# Patient Record
Sex: FEMALE | Race: WHITE | NOT HISPANIC OR LATINO
[De-identification: names, ages, dates, MRNs, and addresses within clinical notes are randomized per-mention and may not be internally consistent; named-entity substitution may affect disease eponyms.]

---

## 2017-06-28 ENCOUNTER — APPOINTMENT (OUTPATIENT)
Dept: BARIATRICS | Facility: CLINIC | Age: 22
End: 2017-06-28

## 2017-06-28 VITALS
BODY MASS INDEX: 32.44 KG/M2 | WEIGHT: 190 LBS | HEIGHT: 64 IN | DIASTOLIC BLOOD PRESSURE: 73 MMHG | HEART RATE: 67 BPM | SYSTOLIC BLOOD PRESSURE: 106 MMHG

## 2017-06-28 DIAGNOSIS — E28.2 POLYCYSTIC OVARIAN SYNDROME: ICD-10-CM

## 2017-06-28 DIAGNOSIS — L83 ACANTHOSIS NIGRICANS: ICD-10-CM

## 2017-06-28 DIAGNOSIS — R63.5 ABNORMAL WEIGHT GAIN: ICD-10-CM

## 2018-10-04 ENCOUNTER — RESULT REVIEW (OUTPATIENT)
Age: 23
End: 2018-10-04

## 2018-12-28 ENCOUNTER — MESSAGE (OUTPATIENT)
Age: 23
End: 2018-12-28

## 2019-07-29 ENCOUNTER — APPOINTMENT (OUTPATIENT)
Dept: PULMONOLOGY | Facility: CLINIC | Age: 24
End: 2019-07-29

## 2019-07-31 ENCOUNTER — APPOINTMENT (OUTPATIENT)
Dept: FAMILY MEDICINE | Facility: CLINIC | Age: 24
End: 2019-07-31
Payer: COMMERCIAL

## 2019-07-31 VITALS
DIASTOLIC BLOOD PRESSURE: 72 MMHG | OXYGEN SATURATION: 100 % | BODY MASS INDEX: 26.12 KG/M2 | WEIGHT: 153 LBS | HEART RATE: 60 BPM | HEIGHT: 64 IN | SYSTOLIC BLOOD PRESSURE: 104 MMHG

## 2019-07-31 DIAGNOSIS — Z00.00 ENCOUNTER FOR GENERAL ADULT MEDICAL EXAMINATION W/OUT ABNORMAL FINDINGS: ICD-10-CM

## 2019-07-31 DIAGNOSIS — Z11.3 ENCOUNTER FOR SCREENING FOR INFECTIONS WITH A PREDOMINANTLY SEXUAL MODE OF TRANSMISSION: ICD-10-CM

## 2019-07-31 PROCEDURE — 36415 COLL VENOUS BLD VENIPUNCTURE: CPT

## 2019-07-31 PROCEDURE — 99385 PREV VISIT NEW AGE 18-39: CPT | Mod: 25

## 2019-07-31 RX ORDER — PHENTERMINE HYDROCHLORIDE 30 MG/1
30 CAPSULE ORAL
Qty: 30 | Refills: 3 | Status: COMPLETED | COMMUNITY
Start: 2017-06-28 | End: 2019-07-31

## 2019-07-31 RX ORDER — SPIRONOLACTONE AND HYDROCHLOROTHIAZIDE 25; 25 MG/1; MG/1
25-25 TABLET, FILM COATED ORAL
Refills: 0 | Status: COMPLETED | COMMUNITY
End: 2019-07-31

## 2019-07-31 NOTE — HEALTH RISK ASSESSMENT
[] : No [Yes] : Yes [No] : In the past 12 months have you used drugs other than those required for medical reasons? No [No falls in past year] : Patient reported no falls in the past year [de-identified] : occasional [[id number]] : 1 [Student] : student [Sexually Active] : sexually active [High Risk Behavior] : no high risk behavior [Fully functional (using the telephone, shopping, preparing meals, housekeeping, doing laundry, using] : Fully functional and needs no help or supervision to perform IADLs (using the telephone, shopping, preparing meals, housekeeping, doing laundry, using transportation, managing medications and managing finances) [Fully functional (bathing, dressing, toileting, transferring, walking, feeding)] : Fully functional (bathing, dressing, toileting, transferring, walking, feeding) [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

## 2019-07-31 NOTE — HISTORY OF PRESENT ILLNESS
[de-identified] : 23 yo F with no significant PMH presents for check up. Her previous PCP no longer takes her insurance. She had lap band procedure done Oct 2018. Prior to that, she had gastric sleeve. She eats mostly carbs and fish/meats. She does not eat much vegetables or fruits. She goes to the gym few times a week, works with  twice weekly. She is nursing student, stressed out. Exams coming up.\par \par pap-- not had pap in many years. She was sexually active with 1 partner but currently not active.  [FreeTextEntry1] : check up

## 2019-08-02 LAB
25(OH)D3 SERPL-MCNC: 32.3 NG/ML
ALBUMIN SERPL ELPH-MCNC: 4.8 G/DL
ALP BLD-CCNC: 58 U/L
ALT SERPL-CCNC: 27 U/L
ANION GAP SERPL CALC-SCNC: 16 MMOL/L
AST SERPL-CCNC: 22 U/L
BASOPHILS # BLD AUTO: 0.04 K/UL
BASOPHILS NFR BLD AUTO: 0.6 %
BILIRUB SERPL-MCNC: 1.2 MG/DL
BUN SERPL-MCNC: 10 MG/DL
C TRACH RRNA SPEC QL NAA+PROBE: NOT DETECTED
CALCIUM SERPL-MCNC: 10.1 MG/DL
CHLORIDE SERPL-SCNC: 106 MMOL/L
CHOLEST SERPL-MCNC: 180 MG/DL
CHOLEST/HDLC SERPL: 3 RATIO
CO2 SERPL-SCNC: 22 MMOL/L
CREAT SERPL-MCNC: 0.7 MG/DL
EOSINOPHIL # BLD AUTO: 0.07 K/UL
EOSINOPHIL NFR BLD AUTO: 1.1 %
ESTIMATED AVERAGE GLUCOSE: 100 MG/DL
FOLATE SERPL-MCNC: 13 NG/ML
GLUCOSE SERPL-MCNC: 80 MG/DL
HAV IGM SER QL: NONREACTIVE
HBA1C MFR BLD HPLC: 5.1 %
HBV CORE IGM SER QL: NONREACTIVE
HBV SURFACE AG SER QL: NONREACTIVE
HCT VFR BLD CALC: 42.9 %
HCV AB SER QL: NONREACTIVE
HCV S/CO RATIO: 0.12 S/CO
HDLC SERPL-MCNC: 60 MG/DL
HGB BLD-MCNC: 13.5 G/DL
HIV1+2 AB SPEC QL IA.RAPID: NONREACTIVE
IMM GRANULOCYTES NFR BLD AUTO: 0.2 %
LDLC SERPL CALC-MCNC: 106 MG/DL
LYMPHOCYTES # BLD AUTO: 1.41 K/UL
LYMPHOCYTES NFR BLD AUTO: 21.8 %
MAN DIFF?: NORMAL
MCHC RBC-ENTMCNC: 28.7 PG
MCHC RBC-ENTMCNC: 31.5 GM/DL
MCV RBC AUTO: 91.1 FL
MONOCYTES # BLD AUTO: 0.39 K/UL
MONOCYTES NFR BLD AUTO: 6 %
N GONORRHOEA RRNA SPEC QL NAA+PROBE: NOT DETECTED
NEUTROPHILS # BLD AUTO: 4.56 K/UL
NEUTROPHILS NFR BLD AUTO: 70.3 %
PLATELET # BLD AUTO: 300 K/UL
POTASSIUM SERPL-SCNC: 4 MMOL/L
PROT SERPL-MCNC: 7.7 G/DL
RBC # BLD: 4.71 M/UL
RBC # FLD: 12.9 %
SODIUM SERPL-SCNC: 144 MMOL/L
SOURCE AMPLIFICATION: NORMAL
T PALLIDUM AB SER QL IA: NEGATIVE
T3FREE SERPL-MCNC: 2.38 PG/ML
T4 FREE SERPL-MCNC: 1.1 NG/DL
TRIGL SERPL-MCNC: 72 MG/DL
TSH SERPL-ACNC: 0.83 UIU/ML
VIT B12 SERPL-MCNC: 715 PG/ML
WBC # FLD AUTO: 6.48 K/UL

## 2019-10-08 ENCOUNTER — APPOINTMENT (OUTPATIENT)
Dept: FAMILY MEDICINE | Facility: CLINIC | Age: 24
End: 2019-10-08
Payer: SELF-PAY

## 2019-10-08 VITALS
DIASTOLIC BLOOD PRESSURE: 62 MMHG | WEIGHT: 161 LBS | TEMPERATURE: 99.2 F | BODY MASS INDEX: 27.49 KG/M2 | SYSTOLIC BLOOD PRESSURE: 98 MMHG | OXYGEN SATURATION: 100 % | HEIGHT: 64 IN | HEART RATE: 69 BPM

## 2019-10-08 DIAGNOSIS — Z01.818 ENCOUNTER FOR OTHER PREPROCEDURAL EXAMINATION: ICD-10-CM

## 2019-10-08 PROCEDURE — 36415 COLL VENOUS BLD VENIPUNCTURE: CPT

## 2019-10-08 PROCEDURE — 99213 OFFICE O/P EST LOW 20 MIN: CPT | Mod: 25

## 2019-10-09 LAB
ALBUMIN SERPL ELPH-MCNC: 4.3 G/DL
ALP BLD-CCNC: 50 U/L
ALT SERPL-CCNC: 20 U/L
ANION GAP SERPL CALC-SCNC: 10 MMOL/L
AST SERPL-CCNC: 15 U/L
BASOPHILS # BLD AUTO: 0.04 K/UL
BASOPHILS NFR BLD AUTO: 0.5 %
BILIRUB SERPL-MCNC: 0.8 MG/DL
BUN SERPL-MCNC: 12 MG/DL
CALCIUM SERPL-MCNC: 9.5 MG/DL
CHLORIDE SERPL-SCNC: 105 MMOL/L
CO2 SERPL-SCNC: 24 MMOL/L
CREAT SERPL-MCNC: 0.62 MG/DL
EOSINOPHIL # BLD AUTO: 0.11 K/UL
EOSINOPHIL NFR BLD AUTO: 1.3 %
GLUCOSE SERPL-MCNC: 74 MG/DL
HCG SERPL-MCNC: <1 MIU/ML
HCT VFR BLD CALC: 36.9 %
HGB BLD-MCNC: 11.4 G/DL
IMM GRANULOCYTES NFR BLD AUTO: 0.4 %
LYMPHOCYTES # BLD AUTO: 2.56 K/UL
LYMPHOCYTES NFR BLD AUTO: 30.1 %
MAN DIFF?: NORMAL
MCHC RBC-ENTMCNC: 28.9 PG
MCHC RBC-ENTMCNC: 30.9 GM/DL
MCV RBC AUTO: 93.4 FL
MONOCYTES # BLD AUTO: 0.6 K/UL
MONOCYTES NFR BLD AUTO: 7.1 %
NEUTROPHILS # BLD AUTO: 5.17 K/UL
NEUTROPHILS NFR BLD AUTO: 60.6 %
PLATELET # BLD AUTO: 283 K/UL
POTASSIUM SERPL-SCNC: 4.4 MMOL/L
PROT SERPL-MCNC: 6.9 G/DL
RBC # BLD: 3.95 M/UL
RBC # FLD: 14.2 %
SODIUM SERPL-SCNC: 139 MMOL/L
WBC # FLD AUTO: 8.51 K/UL

## 2019-10-09 NOTE — HISTORY OF PRESENT ILLNESS
[No Pertinent Cardiac History] : no history of aortic stenosis, atrial fibrillation, coronary artery disease, recent myocardial infarction, or implantable device/pacemaker [No Adverse Anesthesia Reaction] : no adverse anesthesia reaction in self or family member [No Pertinent Pulmonary History] : no history of asthma, COPD, sleep apnea, or smoking [Good (7-10 METs)] : Good (7-10 METs) [(Patient denies any chest pain, claudication, dyspnea on exertion, orthopnea, palpitations or syncope)] : Patient denies any chest pain, claudication, dyspnea on exertion, orthopnea, palpitations or syncope [Diabetes] : no diabetes [Chronic Anticoagulation] : no chronic anticoagulation [Chronic Kidney Disease] : no chronic kidney disease [FreeTextEntry2] : 10/18/2019 [FreeTextEntry1] : breast reconstruction [FreeTextEntry4] : 25 yo F will be getting breast lift and removal of excess skin on 10/18. She presents for pre-op clearance. She has not had complications from surgeries/anesthesia. She has no known allergies. [FreeTextEntry3] : Dr. Bob Munoz

## 2019-10-09 NOTE — ADDENDUM
[FreeTextEntry1] : Results reviewed, drop in hgb from previous. Not contraindication for surgery. HCG and CMP WNL. Pt cleared for surgery.

## 2020-01-02 ENCOUNTER — MESSAGE (OUTPATIENT)
Age: 25
End: 2020-01-02

## 2020-02-26 ENCOUNTER — RESULT REVIEW (OUTPATIENT)
Age: 25
End: 2020-02-26

## 2020-07-30 ENCOUNTER — ASOB RESULT (OUTPATIENT)
Age: 25
End: 2020-07-30

## 2020-07-30 ENCOUNTER — APPOINTMENT (OUTPATIENT)
Dept: OBGYN | Facility: CLINIC | Age: 25
End: 2020-07-30
Payer: COMMERCIAL

## 2020-07-30 PROCEDURE — 76830 TRANSVAGINAL US NON-OB: CPT

## 2020-09-23 ENCOUNTER — RESULT REVIEW (OUTPATIENT)
Age: 25
End: 2020-09-23

## 2020-10-29 ENCOUNTER — APPOINTMENT (OUTPATIENT)
Dept: HUMAN REPRODUCTION | Facility: CLINIC | Age: 25
End: 2020-10-29

## 2020-11-03 ENCOUNTER — APPOINTMENT (OUTPATIENT)
Dept: OBGYN | Facility: CLINIC | Age: 25
End: 2020-11-03
Payer: COMMERCIAL

## 2020-11-03 VITALS
HEIGHT: 64 IN | SYSTOLIC BLOOD PRESSURE: 117 MMHG | WEIGHT: 170 LBS | HEART RATE: 86 BPM | BODY MASS INDEX: 29.02 KG/M2 | DIASTOLIC BLOOD PRESSURE: 75 MMHG

## 2020-11-03 DIAGNOSIS — D25.9 LEIOMYOMA OF UTERUS, UNSPECIFIED: ICD-10-CM

## 2020-11-03 PROCEDURE — 99214 OFFICE O/P EST MOD 30 MIN: CPT

## 2020-11-03 PROCEDURE — 99072 ADDL SUPL MATRL&STAF TM PHE: CPT

## 2020-11-06 PROBLEM — D25.9 FIBROID, UTERINE: Status: ACTIVE | Noted: 2020-11-06

## 2020-11-06 NOTE — HISTORY OF PRESENT ILLNESS
[FreeTextEntry1] : 26 yo P0 here for eval of AUB.\par Start combined OCP mid March 2020 in preparation for wedding.\par Last pill 5/18 --> normal menses.\par June 23 - regular menses. Then mestruated again 7 days later.\par Was lighter but then it got heavy - saturating one pad Q 45 min.\par \par Found to have fibroid.\par July - Treated w/ aygestin BID x 1 - stopped menses for 2-3 days then resumed (not quite as heavy as before).\par Started cOCP August.  Had daily bleeding in Sept.\par Oct and Nov used OCP continuously --> no menses.\par \par Had office hsc showing cervical fibroid(?) and Rt upper uterus myoma w/SM component.\par \par \par \par Pt interested in TTC.\par \par Pt referred by Dr. Candelario\par

## 2020-11-06 NOTE — DISCUSSION/SUMMARY
[FreeTextEntry1] : 24 yo P0 w/fibroid ut. Pt w/SM component on USG.\par Possibly 2 intracavitary masses on office hsc, as per pt.\par \par \par Plan\par Talk to Dr Candelario\par \par Book hsc w.olympus bipolar resectoscope.

## 2020-11-18 ENCOUNTER — NON-APPOINTMENT (OUTPATIENT)
Age: 25
End: 2020-11-18

## 2020-11-20 ENCOUNTER — OUTPATIENT (OUTPATIENT)
Dept: OUTPATIENT SERVICES | Facility: HOSPITAL | Age: 25
LOS: 1 days | End: 2020-11-20
Payer: COMMERCIAL

## 2020-11-20 ENCOUNTER — RESULT REVIEW (OUTPATIENT)
Age: 25
End: 2020-11-20

## 2020-11-20 VITALS
TEMPERATURE: 97 F | WEIGHT: 154.98 LBS | SYSTOLIC BLOOD PRESSURE: 108 MMHG | RESPIRATION RATE: 14 BRPM | OXYGEN SATURATION: 100 % | DIASTOLIC BLOOD PRESSURE: 72 MMHG | HEIGHT: 64 IN | HEART RATE: 81 BPM

## 2020-11-20 DIAGNOSIS — Z98.890 OTHER SPECIFIED POSTPROCEDURAL STATES: Chronic | ICD-10-CM

## 2020-11-20 DIAGNOSIS — D25.9 LEIOMYOMA OF UTERUS, UNSPECIFIED: ICD-10-CM

## 2020-11-20 LAB
ANION GAP SERPL CALC-SCNC: 10 MMOL/L — SIGNIFICANT CHANGE UP (ref 5–17)
BLD GP AB SCN SERPL QL: NEGATIVE — SIGNIFICANT CHANGE UP
BUN SERPL-MCNC: 11 MG/DL — SIGNIFICANT CHANGE UP (ref 7–23)
CALCIUM SERPL-MCNC: 9.3 MG/DL — SIGNIFICANT CHANGE UP (ref 8.4–10.5)
CHLORIDE SERPL-SCNC: 106 MMOL/L — SIGNIFICANT CHANGE UP (ref 96–108)
CO2 SERPL-SCNC: 26 MMOL/L — SIGNIFICANT CHANGE UP (ref 22–31)
CREAT SERPL-MCNC: 0.59 MG/DL — SIGNIFICANT CHANGE UP (ref 0.5–1.3)
GLUCOSE SERPL-MCNC: 71 MG/DL — SIGNIFICANT CHANGE UP (ref 70–99)
HCT VFR BLD CALC: 33.9 % — LOW (ref 34.5–45)
HGB BLD-MCNC: 10.2 G/DL — LOW (ref 11.5–15.5)
MCHC RBC-ENTMCNC: 24.1 PG — LOW (ref 27–34)
MCHC RBC-ENTMCNC: 30.1 GM/DL — LOW (ref 32–36)
MCV RBC AUTO: 80.1 FL — SIGNIFICANT CHANGE UP (ref 80–100)
NRBC # BLD: 0 /100 WBCS — SIGNIFICANT CHANGE UP (ref 0–0)
PLATELET # BLD AUTO: 320 K/UL — SIGNIFICANT CHANGE UP (ref 150–400)
POTASSIUM SERPL-MCNC: 4.8 MMOL/L — SIGNIFICANT CHANGE UP (ref 3.5–5.3)
POTASSIUM SERPL-SCNC: 4.8 MMOL/L — SIGNIFICANT CHANGE UP (ref 3.5–5.3)
RBC # BLD: 4.23 M/UL — SIGNIFICANT CHANGE UP (ref 3.8–5.2)
RBC # FLD: 16.4 % — HIGH (ref 10.3–14.5)
RH IG SCN BLD-IMP: POSITIVE — SIGNIFICANT CHANGE UP
SARS-COV-2 RNA SPEC QL NAA+PROBE: SIGNIFICANT CHANGE UP
SODIUM SERPL-SCNC: 142 MMOL/L — SIGNIFICANT CHANGE UP (ref 135–145)
WBC # BLD: 6.12 K/UL — SIGNIFICANT CHANGE UP (ref 3.8–10.5)
WBC # FLD AUTO: 6.12 K/UL — SIGNIFICANT CHANGE UP (ref 3.8–10.5)

## 2020-11-20 PROCEDURE — 85027 COMPLETE CBC AUTOMATED: CPT

## 2020-11-20 PROCEDURE — U0003: CPT

## 2020-11-20 PROCEDURE — 86901 BLOOD TYPING SEROLOGIC RH(D): CPT

## 2020-11-20 PROCEDURE — 86900 BLOOD TYPING SEROLOGIC ABO: CPT

## 2020-11-20 PROCEDURE — 80048 BASIC METABOLIC PNL TOTAL CA: CPT

## 2020-11-20 PROCEDURE — 43999 UNLISTED PROCEDURE STOMACH: CPT

## 2020-11-20 PROCEDURE — G0463: CPT

## 2020-11-20 PROCEDURE — 77002 NEEDLE LOCALIZATION BY XRAY: CPT

## 2020-11-20 PROCEDURE — 86850 RBC ANTIBODY SCREEN: CPT

## 2020-11-20 RX ORDER — LIDOCAINE HCL 20 MG/ML
0.2 VIAL (ML) INJECTION ONCE
Refills: 0 | Status: DISCONTINUED | OUTPATIENT
Start: 2020-11-23 | End: 2020-12-07

## 2020-11-20 RX ORDER — SODIUM CHLORIDE 9 MG/ML
3 INJECTION INTRAMUSCULAR; INTRAVENOUS; SUBCUTANEOUS EVERY 8 HOURS
Refills: 0 | Status: DISCONTINUED | OUTPATIENT
Start: 2020-11-23 | End: 2020-12-07

## 2020-11-20 NOTE — H&P PST ADULT - NSICDXPASTMEDICALHX_GEN_ALL_CORE_FT
PAST MEDICAL HISTORY:  Chronic nonalcoholic liver disease Fatty liver disease, nonalcoholic    Metabolic syndrome X Insulin resistance    Polycystic ovaries Polycystic ovarian syndrome    Severe obesity

## 2020-11-20 NOTE — H&P PST ADULT - HISTORY OF PRESENT ILLNESS
Mrs. Mims is a 25 year old woman with PMH obesity s/p gastric sleeve then s/p lap band who has been having abnormal vaginal bleeding since May 2020 dx with fibroid scheduled for hysteroscopic myomectomy with bipolar resectoscope.    [facility]

## 2020-11-20 NOTE — H&P PST ADULT - NEUROLOGICAL DETAILS
alert and oriented x 3/normal strength/responds to verbal commands/sensation intact/responds to pain

## 2020-11-20 NOTE — H&P PST ADULT - NSICDXFAMILYHX_GEN_ALL_CORE_FT
FAMILY HISTORY:  Family history of cardiovascular disease, Family history of heart disease  Family history of cardiovascular disease, Family history of hypertension  Family history of endocrine and metabolic disease, Family history of high cholesterol  Family history of malignant neoplasm of gastrointestinal tract, Family history of colon cancer

## 2020-11-20 NOTE — H&P PST ADULT - NSICDXPROBLEM_GEN_ALL_CORE_FT
PROBLEM DIAGNOSES  Problem: Leiomyoma of uterus, unspecified  Assessment and Plan: Scheduled for hysteroscopy myomectomy with bipolar resectoscope  Labs pending-COVID, cbc, bmp and T&S  Preop instructions given.  Lap band deflation ordered to be done today.  POCT on dos urine for pregnancy

## 2020-11-20 NOTE — H&P PST ADULT - NSICDXPASTSURGICALHX_GEN_ALL_CORE_FT
PAST SURGICAL HISTORY:  Cytomegalovirus infection not present No significant past surgical history    H/O breast reconstruction 2019 s/p weight loss    History of gastric surgery 2014 gastric sleeve; lap band 2018

## 2020-11-20 NOTE — H&P PST ADULT - GASTROINTESTINAL
65M PMHx HTN, DM, HLD, HFrEF (15-25% on echo), CAD s/p PCI 7 years ago presented 7/20 with altered mental status in the setting of one week of dyspnea and weakness admitted to the CCU for management of ACS, acute on chronic CHF, RONNIE. Hospital course c/b acute upper GI bleed 2/2 two gastric ulcers; s/p clipping of one ulcer (7/27) s/p 3U PRBCs, found to have urosepsis currently on Ceftriaxone, stepped down from CCU to [facility] for further management, now pending acute rehab placement. negative detailed exam

## 2020-11-20 NOTE — H&P PST ADULT - NSANTHOSAYNRD_GEN_A_CORE
No. DONA screening performed.  STOP BANG Legend: 0-2 = LOW Risk; 3-4 = INTERMEDIATE Risk; 5-8 = HIGH Risk

## 2020-11-22 ENCOUNTER — TRANSCRIPTION ENCOUNTER (OUTPATIENT)
Age: 25
End: 2020-11-22

## 2020-11-23 ENCOUNTER — RESULT REVIEW (OUTPATIENT)
Age: 25
End: 2020-11-23

## 2020-11-23 ENCOUNTER — OUTPATIENT (OUTPATIENT)
Dept: OUTPATIENT SERVICES | Facility: HOSPITAL | Age: 25
LOS: 1 days | End: 2020-11-23
Payer: COMMERCIAL

## 2020-11-23 ENCOUNTER — APPOINTMENT (OUTPATIENT)
Dept: OBGYN | Facility: HOSPITAL | Age: 25
End: 2020-11-23

## 2020-11-23 VITALS
RESPIRATION RATE: 16 BRPM | OXYGEN SATURATION: 99 % | SYSTOLIC BLOOD PRESSURE: 103 MMHG | HEIGHT: 64 IN | DIASTOLIC BLOOD PRESSURE: 65 MMHG | WEIGHT: 154.98 LBS | HEART RATE: 64 BPM | TEMPERATURE: 98 F

## 2020-11-23 VITALS — HEART RATE: 68 BPM

## 2020-11-23 DIAGNOSIS — Z98.890 OTHER SPECIFIED POSTPROCEDURAL STATES: Chronic | ICD-10-CM

## 2020-11-23 DIAGNOSIS — D25.9 LEIOMYOMA OF UTERUS, UNSPECIFIED: ICD-10-CM

## 2020-11-23 DIAGNOSIS — N93.9 ABNORMAL UTERINE AND VAGINAL BLEEDING, UNSPECIFIED: ICD-10-CM

## 2020-11-23 LAB — RH IG SCN BLD-IMP: POSITIVE — SIGNIFICANT CHANGE UP

## 2020-11-23 PROCEDURE — 88305 TISSUE EXAM BY PATHOLOGIST: CPT

## 2020-11-23 PROCEDURE — 58558 HYSTEROSCOPY BIOPSY: CPT

## 2020-11-23 PROCEDURE — 88305 TISSUE EXAM BY PATHOLOGIST: CPT | Mod: 26

## 2020-11-23 RX ORDER — FENTANYL CITRATE 50 UG/ML
25 INJECTION INTRAVENOUS
Refills: 0 | Status: DISCONTINUED | OUTPATIENT
Start: 2020-11-23 | End: 2020-11-23

## 2020-11-23 RX ORDER — OXYCODONE HYDROCHLORIDE 5 MG/1
5 TABLET ORAL ONCE
Refills: 0 | Status: DISCONTINUED | OUTPATIENT
Start: 2020-11-23 | End: 2020-11-23

## 2020-11-23 RX ORDER — SODIUM CHLORIDE 9 MG/ML
1000 INJECTION, SOLUTION INTRAVENOUS
Refills: 0 | Status: DISCONTINUED | OUTPATIENT
Start: 2020-11-23 | End: 2020-12-07

## 2020-11-23 RX ORDER — ONDANSETRON 8 MG/1
4 TABLET, FILM COATED ORAL ONCE
Refills: 0 | Status: COMPLETED | OUTPATIENT
Start: 2020-11-23 | End: 2020-11-23

## 2020-11-23 RX ADMIN — OXYCODONE HYDROCHLORIDE 5 MILLIGRAM(S): 5 TABLET ORAL at 08:13

## 2020-11-23 RX ADMIN — ONDANSETRON 4 MILLIGRAM(S): 8 TABLET, FILM COATED ORAL at 08:13

## 2020-11-23 RX ADMIN — OXYCODONE HYDROCHLORIDE 5 MILLIGRAM(S): 5 TABLET ORAL at 09:04

## 2020-11-23 NOTE — ASU PATIENT PROFILE, ADULT - PSH
H/O breast reconstruction  2019 s/p weight loss  History of gastric surgery  2014 gastric sleeve; lap band 2018

## 2020-11-23 NOTE — BRIEF OPERATIVE NOTE - NSICDXBRIEFPROCEDURE_GEN_ALL_CORE_FT
PROCEDURES:  Dilation and curettage, uterus 23-Nov-2020 08:08:12  Latisha Chowdhury  Operative hysteroscopy with fluid management system 23-Nov-2020 08:08:01  Latisha Chowdhury

## 2020-11-23 NOTE — ASU DISCHARGE PLAN (ADULT/PEDIATRIC) - CALL YOUR DOCTOR IF YOU HAVE ANY OF THE FOLLOWING:
Pain not relieved by Medications/Fever greater than (need to indicate Fahrenheit or Celsius)/Bleeding that does not stop/Unable to urinate/Nausea and vomiting that does not stop

## 2020-11-23 NOTE — ASU PATIENT PROFILE, ADULT - AS SC BRADEN SENSORY
12/12/2019      Suzanne Whitten  [id number]  [facility] [address]        Dear Suzanne    Thank you for choosing [facility] for your CT Calcium screening. Below is an explanation of the results as well as follow up recommendations from your screening.     Findings:    Your calcium score is 100-299 and <75th for your age/gender.  You have a moderate amount of identifiable coronary plaque. Discussion with your primary care physician is recommended to formulate a plan for optimal cardiovascular health. Medications may be recommended. A heart healthy lifestyle is recommended.      If you provided us with the name of your primary care physician or cardiologist, a copy of the enclosed results will be sent to them.  If your score is abnormal, you may need further testing and treatment.    If you have any questions, please call our Heart  at,   [phone]       Sincerely,    [facility]   (4) no impairment

## 2020-11-23 NOTE — ASU PATIENT PROFILE, ADULT - PMH
Chronic nonalcoholic liver disease  Fatty liver disease, nonalcoholic  Metabolic syndrome X  Insulin resistance  Polycystic ovaries  Polycystic ovarian syndrome  Severe obesity

## 2020-11-23 NOTE — BRIEF OPERATIVE NOTE - OPERATION/FINDINGS
Normal external genitalia, vagina and cervix. Uterine cavity was examined and bilateral ostia located. Endometrial tissue diffusely normal with exception of a pedunculated mass on the anterior portion of the lower uterine segment.

## 2020-11-23 NOTE — ASU DISCHARGE PLAN (ADULT/PEDIATRIC) - CARE PROVIDER_API CALL
Pavan Krishnamurthy (MD)  Obstetrics and Gynecology  [facility] [facility], [address]th [place] [address]  Phone: [phone]  Fax: [phone]  Follow Up Time:

## 2020-11-23 NOTE — ASU DISCHARGE PLAN (ADULT/PEDIATRIC) - ASU DC SPECIAL INSTRUCTIONSFT
Postoperative Instructions      Pain control     For pain control, take the followin. Motrin 600mg four times a day, take with food  2. Add Tylenol 975 four times a day, alternated with motrin    Motrin and Tylenol can be obtained over the counter.         Postoperative restrictions   Do not drive or make important decisions for 24 hours after anesthesia. Nothing in the vagina (tampons, sexual intercourse), No tub baths, pools or hot tubs for 2 weeks (showers are ok!). No lifting anything heavier than 15 lbs, no strenuous exercise for 2 weeks after surgery.        Vaginal bleeding   Spotting and intermittent passage of blood clots per vagina is normal in first few weeks after surgery. If you are soaking 1 pad per hour, that is not normal and you should notify Dr. Krishnamurthy's office and seek medical attention right away.      Signs of Infection  Call the office and/or come to the emergency room for any of the following: foul smelling vaginal discharge, fever over 100.4F, abdominal pain or cramping that does not get better with over the counter medications, nausea/vomiting (especially if you become unable to tolerate oral intake), inability to urinate. Any of these could be signs that you may be developing an infection requiring antibiotics.      Follow Up  Call Dr. Krishnamurthy's office to schedule a postoperative appointment in 2 weeks.

## 2020-11-30 LAB — SURGICAL PATHOLOGY STUDY: SIGNIFICANT CHANGE UP

## 2020-12-08 ENCOUNTER — APPOINTMENT (OUTPATIENT)
Dept: OBGYN | Facility: CLINIC | Age: 25
End: 2020-12-08

## 2020-12-08 VITALS
TEMPERATURE: 98.4 F | DIASTOLIC BLOOD PRESSURE: 76 MMHG | HEART RATE: 84 BPM | HEIGHT: 64 IN | WEIGHT: 157.25 LBS | SYSTOLIC BLOOD PRESSURE: 115 MMHG | BODY MASS INDEX: 26.85 KG/M2

## 2020-12-08 DIAGNOSIS — N80.0 ENDOMETRIOSIS OF UTERUS: ICD-10-CM

## 2020-12-09 PROBLEM — N80.0 ADENOMYOSIS: Status: ACTIVE | Noted: 2020-12-09

## 2020-12-09 NOTE — HISTORY OF PRESENT ILLNESS
[FreeTextEntry1] : 26 yo G0 here for PO check after hsc charleen 11/23/20.\par No major complaints - no recent bleeding.\par Procedure noteworthy for singly intracavitary projection of fibroid or polyp.

## 2020-12-09 NOTE — DISCUSSION/SUMMARY
[FreeTextEntry1] : 24 yo P0 here for PO check after hsc resection of intracavitary adenomyoma. \par Path reviewed w/pt.\par Questions answered.\par Pt interested in TTC soon.\par Precautions reviewed.\par \par Plan\par Pt to f/u w/Dr. Candelario \par Will send letter.\par   Would consider MRI to evaluat for possible adeno if symptoms return

## 2021-01-08 ENCOUNTER — APPOINTMENT (OUTPATIENT)
Dept: OBGYN | Facility: HOSPITAL | Age: 26
End: 2021-01-08

## 2021-01-21 ENCOUNTER — APPOINTMENT (OUTPATIENT)
Dept: OBGYN | Facility: CLINIC | Age: 26
End: 2021-01-21

## 2021-02-16 ENCOUNTER — APPOINTMENT (OUTPATIENT)
Dept: ANTEPARTUM | Facility: CLINIC | Age: 26
End: 2021-02-16
Payer: COMMERCIAL

## 2021-02-16 ENCOUNTER — ASOB RESULT (OUTPATIENT)
Age: 26
End: 2021-02-16

## 2021-02-16 PROCEDURE — 36416 COLLJ CAPILLARY BLOOD SPEC: CPT

## 2021-02-16 PROCEDURE — 99072 ADDL SUPL MATRL&STAF TM PHE: CPT

## 2021-02-16 PROCEDURE — 76813 OB US NUCHAL MEAS 1 GEST: CPT

## 2021-02-16 PROCEDURE — 76801 OB US < 14 WKS SINGLE FETUS: CPT

## 2021-04-06 ENCOUNTER — ASOB RESULT (OUTPATIENT)
Age: 26
End: 2021-04-06

## 2021-04-06 ENCOUNTER — APPOINTMENT (OUTPATIENT)
Dept: ANTEPARTUM | Facility: CLINIC | Age: 26
End: 2021-04-06
Payer: COMMERCIAL

## 2021-04-06 PROCEDURE — 99072 ADDL SUPL MATRL&STAF TM PHE: CPT

## 2021-04-06 PROCEDURE — 76805 OB US >/= 14 WKS SNGL FETUS: CPT

## 2021-06-18 ENCOUNTER — APPOINTMENT (OUTPATIENT)
Dept: ANTEPARTUM | Facility: CLINIC | Age: 26
End: 2021-06-18

## 2021-06-23 ENCOUNTER — ASOB RESULT (OUTPATIENT)
Age: 26
End: 2021-06-23

## 2021-06-23 ENCOUNTER — APPOINTMENT (OUTPATIENT)
Dept: ANTEPARTUM | Facility: CLINIC | Age: 26
End: 2021-06-23
Payer: COMMERCIAL

## 2021-06-23 PROCEDURE — 99072 ADDL SUPL MATRL&STAF TM PHE: CPT

## 2021-06-23 PROCEDURE — 76816 OB US FOLLOW-UP PER FETUS: CPT

## 2021-06-23 PROCEDURE — 76819 FETAL BIOPHYS PROFIL W/O NST: CPT

## 2021-08-16 ENCOUNTER — INPATIENT (INPATIENT)
Facility: HOSPITAL | Age: 26
LOS: 1 days | Discharge: HOME | End: 2021-08-18
Attending: SPECIALIST | Admitting: SPECIALIST

## 2021-08-16 VITALS
RESPIRATION RATE: 16 BRPM | HEART RATE: 59 BPM | TEMPERATURE: 98 F | DIASTOLIC BLOOD PRESSURE: 54 MMHG | SYSTOLIC BLOOD PRESSURE: 97 MMHG

## 2021-08-16 DIAGNOSIS — Z98.890 OTHER SPECIFIED POSTPROCEDURAL STATES: Chronic | ICD-10-CM

## 2021-08-16 DIAGNOSIS — O26.899 OTHER SPECIFIED PREGNANCY RELATED CONDITIONS, UNSPECIFIED TRIMESTER: ICD-10-CM

## 2021-08-16 DIAGNOSIS — Z3A.00 WEEKS OF GESTATION OF PREGNANCY NOT SPECIFIED: ICD-10-CM

## 2021-08-16 RX ORDER — SODIUM CHLORIDE 9 MG/ML
1000 INJECTION, SOLUTION INTRAVENOUS
Refills: 0 | Status: DISCONTINUED | OUTPATIENT
Start: 2021-08-16 | End: 2021-08-17

## 2021-08-16 RX ORDER — OXYTOCIN 10 UNIT/ML
333.33 VIAL (ML) INJECTION
Qty: 20 | Refills: 0 | Status: COMPLETED | OUTPATIENT
Start: 2021-08-16 | End: 2021-08-17

## 2021-08-16 RX ORDER — OXYTOCIN 10 UNIT/ML
2 VIAL (ML) INJECTION
Qty: 30 | Refills: 0 | Status: DISCONTINUED | OUTPATIENT
Start: 2021-08-16 | End: 2021-08-17

## 2021-08-16 RX ORDER — IBUPROFEN 200 MG
1 TABLET ORAL
Qty: 0 | Refills: 0 | DISCHARGE

## 2021-08-16 RX ORDER — ACETAMINOPHEN 500 MG
2 TABLET ORAL
Qty: 0 | Refills: 0 | DISCHARGE

## 2021-08-16 RX ORDER — CITRIC ACID/SODIUM CITRATE 300-500 MG
15 SOLUTION, ORAL ORAL EVERY 6 HOURS
Refills: 0 | Status: DISCONTINUED | OUTPATIENT
Start: 2021-08-16 | End: 2021-08-17

## 2021-08-16 NOTE — OB PROVIDER TRIAGE NOTE - NSHPPHYSICALEXAM_GEN_ALL_CORE
Vital Signs Last 24 Hrs  T(C): 36.9 (16 Aug 2021 20:58), Max: 36.9 (16 Aug 2021 20:58)  T(F): 98.4 (16 Aug 2021 20:58), Max: 98.4 (16 Aug 2021 20:58)  HR: 64 (16 Aug 2021 21:45) (59 - 64)  BP: 99/59 (16 Aug 2021 21:45) (97/54 - 113/71)  BP(mean): --  RR: 16 (16 Aug 2021 20:58) (16 - 16)  SpO2: --    Abdomen: soft, non tender. no guarding or rebound tenderness  SSE:  +pooling, clear odorless fluid  +nitrazine  +ferning  no active lesions noted  SVE:   3-4/70/-3  TAS: vertex presentation  EFW 3175gm by Leopold's    NST reactive with moderate variability, cat 1  toco ctx 2-3 minutes

## 2021-08-16 NOTE — OB PROVIDER TRIAGE NOTE - DOMESTIC TRAVEL HIGH RISK QUESTION
Spoke to patient regarding lab results. Patient states her weight is stable at 200 pounds. Does note leg swelling, more so in the left leg. SOB also noted, and \"some days are better than others\". She goes on to state she has had chest discomfort on the left side of her chest. Onset about 2 weeks ago. Describes this as a dull pain and does radiate. Today the pain is radiating into the neck. Denies dizziness, lightheaded, nausea, diaphoresis. Patient is speaking clearly.      Please advise.    No

## 2021-08-16 NOTE — OB PROVIDER TRIAGE NOTE - HISTORY OF PRESENT ILLNESS
27 y/o pt 38 weeks  presents to triage with c/o rupture of membranes, clear odorless fluid @ 1700. pt denies any contractions or bleeding. pt denies n/v/d, fever or chills. 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 pt denies any contractions or bleeding. pt denies n/v/d, fever or chills. 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

## 2021-08-16 NOTE — OB PROVIDER TRIAGE NOTE - NSOBPROVIDERNOTE_OBGYN_ALL_OB_FT
25 y/o pt 38 weeks  presents with PROM   d/w Dr Chavez and Dr Collier  Plan:  -Admit l&d. Routine labs  -Expectant management of labor, possible Pitocin for Augmentation  -Fetus: cat 1 tracing, vertex presentation, continuous monitoring  -GBS negative  -Pain: Patient denies pain management at this time  -Covid 19 pending for patient and support person  -Consents signed and witnessed at bedside

## 2021-08-16 NOTE — OB PROVIDER H&P - PROBLEM SELECTOR PLAN 1
-Admit l&d. Routine labs  -Expectant management of labor, possible Pitocin for Augmentation  -Fetus: cat 1 tracing, vertex presentation, continuous monitoring  -GBS negative  -Pain: Patient denies pain management at this time  -Covid 19 pending for patient and support person  -Consents signed and witnessed at bedside

## 2021-08-16 NOTE — OB PROVIDER H&P - HISTORY OF PRESENT ILLNESS
25 y/o pt 38 weeks  presents to triage with c/o rupture of membranes, clear odorless fluid @ 1700. pt denies any contractions or bleeding. pt denies n/v/d, fever or chills. 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

## 2021-08-16 NOTE — OB PROVIDER H&P - NS_VAGBLEEDING_OBGYN_ALL_OB
Status: Pt is on a 1:1 at all times  She was RIS talking to her mom & daughter out loud  Pt is still disrobing & sexually preoccupied  She had a bowel movement overnight & placed it in her sink    She slept about 2 5 hours & running down the pérez to get away from her 1:1     Medication: continue to increase Seroquel & Depakote  D/C: TBD     09/19/19 0762   Team Meeting   Meeting Type Daily Rounds   Team Members Present   Team Members Present Physician;Nurse;;Occupational Therapist   Physician Team Member Fiona Whaley   Nursing Team Member [id number]  [facility] Management Team Member Annabella Degroot / Hugo Nagy   OT Team Member Luci   Patient/Family Present   Patient Present No   Patient's Family Present No None

## 2021-08-16 NOTE — OB PROVIDER LABOR PROGRESS NOTE - ASSESSMENT
Plan: 26y y/o  @ 38w in stable condition  - Con't IOL  - Continuous EFM, [place]  - Con't IVF    ***d/w attending physician Dr. Zee Fernandez MD  PGY-1 Plan: 26y y/o  @ 38w in stable condition  - Con't IOL with pitocin, to be covered by Dr Byrd  - Continuous EFM, [place]  - Con't IVF    Zee Fernandez MD  PGY-1

## 2021-08-17 ENCOUNTER — TRANSCRIPTION ENCOUNTER (OUTPATIENT)
Age: 26
End: 2021-08-17

## 2021-08-17 LAB
BASOPHILS # BLD AUTO: 0.03 K/UL — SIGNIFICANT CHANGE UP (ref 0–0.2)
BASOPHILS NFR BLD AUTO: 0.3 % — SIGNIFICANT CHANGE UP (ref 0–2)
BLD GP AB SCN SERPL QL: NEGATIVE — SIGNIFICANT CHANGE UP
COVID-19 SPIKE DOMAIN AB INTERP: POSITIVE
COVID-19 SPIKE DOMAIN ANTIBODY RESULT: 167 U/ML — HIGH
EOSINOPHIL # BLD AUTO: 0.03 K/UL — SIGNIFICANT CHANGE UP (ref 0–0.5)
EOSINOPHIL NFR BLD AUTO: 0.3 % — SIGNIFICANT CHANGE UP (ref 0–6)
HCT VFR BLD CALC: 32.1 % — LOW (ref 34.5–45)
HGB BLD-MCNC: 10.1 G/DL — LOW (ref 11.5–15.5)
IANC: 7.82 K/UL — SIGNIFICANT CHANGE UP (ref 1.5–8.5)
IMM GRANULOCYTES NFR BLD AUTO: 0.3 % — SIGNIFICANT CHANGE UP (ref 0–1.5)
LYMPHOCYTES # BLD AUTO: 2.28 K/UL — SIGNIFICANT CHANGE UP (ref 1–3.3)
LYMPHOCYTES # BLD AUTO: 21.2 % — SIGNIFICANT CHANGE UP (ref 13–44)
MCHC RBC-ENTMCNC: 26.7 PG — LOW (ref 27–34)
MCHC RBC-ENTMCNC: 31.5 GM/DL — LOW (ref 32–36)
MCV RBC AUTO: 84.9 FL — SIGNIFICANT CHANGE UP (ref 80–100)
MONOCYTES # BLD AUTO: 0.54 K/UL — SIGNIFICANT CHANGE UP (ref 0–0.9)
MONOCYTES NFR BLD AUTO: 5 % — SIGNIFICANT CHANGE UP (ref 2–14)
NEUTROPHILS # BLD AUTO: 7.82 K/UL — HIGH (ref 1.8–7.4)
NEUTROPHILS NFR BLD AUTO: 72.9 % — SIGNIFICANT CHANGE UP (ref 43–77)
NRBC # BLD: 0 /100 WBCS — SIGNIFICANT CHANGE UP
NRBC # FLD: 0 K/UL — SIGNIFICANT CHANGE UP
PLATELET # BLD AUTO: 237 K/UL — SIGNIFICANT CHANGE UP (ref 150–400)
RBC # BLD: 3.78 M/UL — LOW (ref 3.8–5.2)
RBC # FLD: 18.9 % — HIGH (ref 10.3–14.5)
RH IG SCN BLD-IMP: POSITIVE — SIGNIFICANT CHANGE UP
SARS-COV-2 IGG+IGM SERPL QL IA: 167 U/ML — HIGH
SARS-COV-2 IGG+IGM SERPL QL IA: POSITIVE
SARS-COV-2 RNA SPEC QL NAA+PROBE: SIGNIFICANT CHANGE UP
T PALLIDUM AB TITR SER: NEGATIVE — SIGNIFICANT CHANGE UP
WBC # BLD: 10.73 K/UL — HIGH (ref 3.8–10.5)
WBC # FLD AUTO: 10.73 K/UL — HIGH (ref 3.8–10.5)

## 2021-08-17 RX ORDER — OXYCODONE HYDROCHLORIDE 5 MG/1
5 TABLET ORAL ONCE
Refills: 0 | Status: DISCONTINUED | OUTPATIENT
Start: 2021-08-17 | End: 2021-08-18

## 2021-08-17 RX ORDER — BENZOCAINE 10 %
1 GEL (GRAM) MUCOUS MEMBRANE EVERY 6 HOURS
Refills: 0 | Status: DISCONTINUED | OUTPATIENT
Start: 2021-08-17 | End: 2021-08-18

## 2021-08-17 RX ORDER — DIBUCAINE 1 %
1 OINTMENT (GRAM) RECTAL EVERY 6 HOURS
Refills: 0 | Status: DISCONTINUED | OUTPATIENT
Start: 2021-08-17 | End: 2021-08-18

## 2021-08-17 RX ORDER — ACETAMINOPHEN 500 MG
975 TABLET ORAL
Refills: 0 | Status: DISCONTINUED | OUTPATIENT
Start: 2021-08-17 | End: 2021-08-18

## 2021-08-17 RX ORDER — AER TRAVELER 0.5 G/1
1 SOLUTION RECTAL; TOPICAL EVERY 4 HOURS
Refills: 0 | Status: DISCONTINUED | OUTPATIENT
Start: 2021-08-17 | End: 2021-08-18

## 2021-08-17 RX ORDER — SODIUM CHLORIDE 9 MG/ML
3 INJECTION INTRAMUSCULAR; INTRAVENOUS; SUBCUTANEOUS EVERY 8 HOURS
Refills: 0 | Status: DISCONTINUED | OUTPATIENT
Start: 2021-08-17 | End: 2021-08-18

## 2021-08-17 RX ORDER — SIMETHICONE 80 MG/1
80 TABLET, CHEWABLE ORAL EVERY 4 HOURS
Refills: 0 | Status: DISCONTINUED | OUTPATIENT
Start: 2021-08-17 | End: 2021-08-18

## 2021-08-17 RX ORDER — PRAMOXINE HYDROCHLORIDE 150 MG/15G
1 AEROSOL, FOAM RECTAL EVERY 4 HOURS
Refills: 0 | Status: DISCONTINUED | OUTPATIENT
Start: 2021-08-17 | End: 2021-08-18

## 2021-08-17 RX ORDER — IBUPROFEN 200 MG
600 TABLET ORAL EVERY 6 HOURS
Refills: 0 | Status: DISCONTINUED | OUTPATIENT
Start: 2021-08-17 | End: 2021-08-18

## 2021-08-17 RX ORDER — KETOROLAC TROMETHAMINE 30 MG/ML
30 SYRINGE (ML) INJECTION ONCE
Refills: 0 | Status: DISCONTINUED | OUTPATIENT
Start: 2021-08-17 | End: 2021-08-17

## 2021-08-17 RX ORDER — LANOLIN
1 OINTMENT (GRAM) TOPICAL EVERY 6 HOURS
Refills: 0 | Status: DISCONTINUED | OUTPATIENT
Start: 2021-08-17 | End: 2021-08-18

## 2021-08-17 RX ORDER — OXYCODONE HYDROCHLORIDE 5 MG/1
5 TABLET ORAL
Refills: 0 | Status: DISCONTINUED | OUTPATIENT
Start: 2021-08-17 | End: 2021-08-18

## 2021-08-17 RX ORDER — IBUPROFEN 200 MG
600 TABLET ORAL EVERY 6 HOURS
Refills: 0 | Status: COMPLETED | OUTPATIENT
Start: 2021-08-17 | End: 2022-07-16

## 2021-08-17 RX ORDER — MAGNESIUM HYDROXIDE 400 MG/1
30 TABLET, CHEWABLE ORAL
Refills: 0 | Status: DISCONTINUED | OUTPATIENT
Start: 2021-08-17 | End: 2021-08-18

## 2021-08-17 RX ORDER — TETANUS TOXOID, REDUCED DIPHTHERIA TOXOID AND ACELLULAR PERTUSSIS VACCINE, ADSORBED 5; 2.5; 8; 8; 2.5 [IU]/.5ML; [IU]/.5ML; UG/.5ML; UG/.5ML; UG/.5ML
0.5 SUSPENSION INTRAMUSCULAR ONCE
Refills: 0 | Status: DISCONTINUED | OUTPATIENT
Start: 2021-08-17 | End: 2021-08-18

## 2021-08-17 RX ORDER — DIPHENHYDRAMINE HCL 50 MG
25 CAPSULE ORAL EVERY 6 HOURS
Refills: 0 | Status: DISCONTINUED | OUTPATIENT
Start: 2021-08-17 | End: 2021-08-18

## 2021-08-17 RX ORDER — HYDROCORTISONE 1 %
1 OINTMENT (GRAM) TOPICAL EVERY 6 HOURS
Refills: 0 | Status: DISCONTINUED | OUTPATIENT
Start: 2021-08-17 | End: 2021-08-18

## 2021-08-17 RX ADMIN — SODIUM CHLORIDE 3 MILLILITER(S): 9 INJECTION INTRAMUSCULAR; INTRAVENOUS; SUBCUTANEOUS at 22:15

## 2021-08-17 RX ADMIN — Medication 975 MILLIGRAM(S): at 20:26

## 2021-08-17 RX ADMIN — Medication 975 MILLIGRAM(S): at 19:56

## 2021-08-17 RX ADMIN — Medication 1000 MILLIUNIT(S)/MIN: at 09:06

## 2021-08-17 RX ADMIN — Medication 30 MILLIGRAM(S): at 11:28

## 2021-08-17 RX ADMIN — Medication 30 MILLIGRAM(S): at 09:07

## 2021-08-17 NOTE — DISCHARGE NOTE OB - CARE PLAN
1 Principal Discharge DX:	 (spontaneous vaginal delivery)  Assessment and plan of treatment:	F/U 6 WEEKS

## 2021-08-17 NOTE — DISCHARGE NOTE OB - PATIENT PORTAL LINK FT
You can access the FollowMyHealth Patient Portal offered by [facility] by registering at the following website: [email or web address]. By joining [employer]’s FollowMyHealth portal, you will also be able to view your health information using other applications (apps) compatible with our system.

## 2021-08-17 NOTE — DISCHARGE NOTE OB - MATERIALS PROVIDED
Vaccinations/[facility]  Screening Program/  Immunization Record/Guide to Postpartum Care/[facility] Hearing Screen Program/Back To Sleep Handout/Shaken Baby Prevention Handout/Breastfeeding Guide and Packet/Birth Certificate Instructions/Discharge Medication Information for Patients and Families Pocket Guide

## 2021-08-17 NOTE — OB PROVIDER LABOR PROGRESS NOTE - ASSESSMENT
A/P:   26y G1 @ 38.1wga admitted after SROM @ 5p    #Labor   - Currently on Pitocin   - Will have pt labor down via peanut ball     #Fetal Wellbeing   - Cat 1    #Pain Control   - s/p epidural w/ plan for top-off     Alfredo Law, PGY-1    d/w Dr. Chavez

## 2021-08-17 NOTE — DISCHARGE NOTE OB - CARE PROVIDER_API CALL
Rayo Candelario)  Obstetrics and Gynecology  [address] [place] [address]  Phone: [phone]  Fax: [phone]  Follow Up Time:

## 2021-08-17 NOTE — OB PROVIDER LABOR PROGRESS NOTE - NS_SUBJECTIVE/OBJECTIVE_OBGYN_ALL_OB_FT
R1 Labor & Delivery Progress Note     Pt seen & examined at bedside for cervical exam.    SVE:  7/70/-3  EFM: 125/mod. variability/+accels/-decels  [place]: irregular    T(C): 36.9 (08-16-21 @ 20:58), Max: 36.9 (08-16-21 @ 20:58)  HR: 67 (08-16-21 @ 23:24) (59 - 72)  BP: 127/58 (08-16-21 @ 23:24) (97/54 - 127/58)  RR: 16 (08-16-21 @ 20:58) (16 - 16)  SpO2: --
PGY1 Labor & Delivery Progress Note     Pt examined @ 0610 due to increased rectal pressure     T(C): 36.8 (08-16-21 @ 23:21), Max: 36.9 (08-16-21 @ 20:58)  HR: 71 (08-17-21 @ 06:10) (51 - 85)  BP: 99/55 (08-17-21 @ 05:54) (94/55 - 127/58)  RR: 16 (08-16-21 @ 23:21) (16 - 16)  SpO2: 100% (08-17-21 @ 05:55) (98% - 100%)

## 2021-08-17 NOTE — DISCHARGE NOTE OB - MEDICATION SUMMARY - MEDICATIONS TO STOP TAKING
I will STOP taking the medications listed below when I get home from the hospital:    Tylenol 500 mg oral tablet  -- 2 tab(s) by mouth every 6 hours    Valtrex 500 mg oral tablet  -- 1 tab(s) by mouth once a day

## 2021-08-17 NOTE — OB RN DELIVERY SUMMARY - NS_SEPSISRSKCALC_OBGYN_ALL_OB_FT
EOS calculated successfully. EOS Risk Factor: 0.5/1000 live births ([place] national incidence); GA=38w1d; Temp=99.32; ROM=7.683; GBS='Negative'; Antibiotics='No antibiotics or any antibiotics < 2 hrs prior to birth'

## 2021-08-17 NOTE — DISCHARGE NOTE OB - MEDICATION SUMMARY - MEDICATIONS TO CHANGE
I will SWITCH the dose or number of times a day I take the medications listed below when I get home from the hospital:    Motrin 600 mg oral tablet  -- 1 tab(s) by mouth every 6 hours

## 2021-08-18 VITALS
DIASTOLIC BLOOD PRESSURE: 71 MMHG | SYSTOLIC BLOOD PRESSURE: 112 MMHG | OXYGEN SATURATION: 100 % | TEMPERATURE: 98 F | HEART RATE: 65 BPM | RESPIRATION RATE: 18 BRPM

## 2021-08-18 RX ORDER — VALACYCLOVIR 500 MG/1
1 TABLET, FILM COATED ORAL
Qty: 0 | Refills: 0 | DISCHARGE

## 2021-08-18 RX ADMIN — Medication 975 MILLIGRAM(S): at 04:09

## 2021-08-18 RX ADMIN — SODIUM CHLORIDE 3 MILLILITER(S): 9 INJECTION INTRAMUSCULAR; INTRAVENOUS; SUBCUTANEOUS at 06:00

## 2021-08-18 RX ADMIN — Medication 975 MILLIGRAM(S): at 09:55

## 2021-08-18 RX ADMIN — Medication 975 MILLIGRAM(S): at 04:39

## 2021-08-18 RX ADMIN — Medication 975 MILLIGRAM(S): at 11:07

## 2021-08-18 NOTE — PROGRESS NOTE ADULT - SUBJECTIVE AND OBJECTIVE BOX
Anesthesia Post-op Note    POD#1 S/P vaginal delivery    Patient is doing well.  OOBAA. Tolerating clears.  Pain is tolerable.  No residual anesthetic issues or complications noted.    Carlos Skinner CRNA

## 2022-03-01 ENCOUNTER — RESULT REVIEW (OUTPATIENT)
Age: 27
End: 2022-03-01

## 2022-04-25 ENCOUNTER — TRANSCRIPTION ENCOUNTER (OUTPATIENT)
Age: 27
End: 2022-04-25

## 2022-04-25 ENCOUNTER — ASOB RESULT (OUTPATIENT)
Age: 27
End: 2022-04-25

## 2022-04-25 ENCOUNTER — APPOINTMENT (OUTPATIENT)
Dept: ANTEPARTUM | Facility: CLINIC | Age: 27
End: 2022-04-25
Payer: MEDICAID

## 2022-04-25 PROCEDURE — 99202 OFFICE O/P NEW SF 15 MIN: CPT | Mod: 25

## 2022-04-25 PROCEDURE — 76802 OB US < 14 WKS ADDL FETUS: CPT

## 2022-04-25 PROCEDURE — 76814 OB US NUCHAL MEAS ADD-ON: CPT

## 2022-04-25 PROCEDURE — 76813 OB US NUCHAL MEAS 1 GEST: CPT

## 2022-04-25 PROCEDURE — 76801 OB US < 14 WKS SINGLE FETUS: CPT

## 2022-05-24 ENCOUNTER — ASOB RESULT (OUTPATIENT)
Age: 27
End: 2022-05-24

## 2022-05-24 ENCOUNTER — APPOINTMENT (OUTPATIENT)
Dept: ANTEPARTUM | Facility: CLINIC | Age: 27
End: 2022-05-24
Payer: MEDICAID

## 2022-05-24 PROCEDURE — 76817 TRANSVAGINAL US OBSTETRIC: CPT

## 2022-05-24 PROCEDURE — 76816 OB US FOLLOW-UP PER FETUS: CPT | Mod: 59

## 2022-06-15 ENCOUNTER — ASOB RESULT (OUTPATIENT)
Age: 27
End: 2022-06-15

## 2022-06-15 ENCOUNTER — APPOINTMENT (OUTPATIENT)
Dept: ANTEPARTUM | Facility: CLINIC | Age: 27
End: 2022-06-15
Payer: MEDICAID

## 2022-06-15 PROCEDURE — 76812 OB US DETAILED ADDL FETUS: CPT

## 2022-06-15 PROCEDURE — 76811 OB US DETAILED SNGL FETUS: CPT

## 2022-06-15 PROCEDURE — 76817 TRANSVAGINAL US OBSTETRIC: CPT

## 2022-06-30 ENCOUNTER — OUTPATIENT (OUTPATIENT)
Dept: OUTPATIENT SERVICES | Age: 27
LOS: 1 days | Discharge: HOME | End: 2022-06-30

## 2022-06-30 ENCOUNTER — APPOINTMENT (OUTPATIENT)
Dept: ANTEPARTUM | Facility: CLINIC | Age: 27
End: 2022-06-30

## 2022-06-30 DIAGNOSIS — Z98.890 OTHER SPECIFIED POSTPROCEDURAL STATES: Chronic | ICD-10-CM

## 2022-07-01 ENCOUNTER — APPOINTMENT (OUTPATIENT)
Dept: PEDIATRIC CARDIOLOGY | Facility: CLINIC | Age: 27
End: 2022-07-01

## 2022-07-01 PROCEDURE — 99202 OFFICE O/P NEW SF 15 MIN: CPT | Mod: 25

## 2022-07-01 PROCEDURE — 76827 ECHO EXAM OF FETAL HEART: CPT | Mod: 59

## 2022-07-01 PROCEDURE — 76825 ECHO EXAM OF FETAL HEART: CPT

## 2022-07-01 PROCEDURE — 76820 UMBILICAL ARTERY ECHO: CPT | Mod: 59

## 2022-07-01 PROCEDURE — 76825 ECHO EXAM OF FETAL HEART: CPT | Mod: 59

## 2022-07-01 PROCEDURE — 76821 MIDDLE CEREBRAL ARTERY ECHO: CPT | Mod: 59

## 2022-07-01 PROCEDURE — 93325 DOPPLER ECHO COLOR FLOW MAPG: CPT | Mod: 59

## 2022-07-01 PROCEDURE — 76821 MIDDLE CEREBRAL ARTERY ECHO: CPT

## 2022-07-01 PROCEDURE — 76820 UMBILICAL ARTERY ECHO: CPT

## 2022-07-08 ENCOUNTER — ASOB RESULT (OUTPATIENT)
Age: 27
End: 2022-07-08

## 2022-07-08 ENCOUNTER — APPOINTMENT (OUTPATIENT)
Dept: ANTEPARTUM | Facility: CLINIC | Age: 27
End: 2022-07-08

## 2022-07-08 PROCEDURE — 76816 OB US FOLLOW-UP PER FETUS: CPT

## 2022-07-08 PROCEDURE — 99212 OFFICE O/P EST SF 10 MIN: CPT | Mod: 25

## 2022-07-08 PROCEDURE — 76820 UMBILICAL ARTERY ECHO: CPT

## 2022-07-09 NOTE — OB RN PATIENT PROFILE - TOBACCO USE
Ms. Chikis Ambrocio is a 58 y.o. female who is here for evaluation of   Chief Complaint   Patient presents with    Pre-op Exam     Scheduled to have \"eye muscle surgery\" at [facility] . Office fax [phone]   . ASSESSMENT AND PLAN:    1. Diminished vision  She is medically stable for her planned ophthalmologic surgery at [facility]. 2. Type 2 diabetes mellitus with hyperglycemia, with long-term current use of insulin (HCC)  Checking A1c today  - HEMOGLOBIN A1C WITH EAG; Future  - HEMOGLOBIN A1C WITH EAG    3. Essential hypertension  Blood pressure is at goal  - AMB POC EKG ROUTINE W/ 12 LEADS, INTER & REP  - CBC WITH AUTOMATED DIFF; Future  - METABOLIC PANEL, COMPREHENSIVE; Future  - CBC WITH AUTOMATED DIFF  - METABOLIC PANEL, COMPREHENSIVE    4. Chronic right shoulder pain  Medically stable for right shoulder surgery with Dr. Diamond Conroy    5. Preop cardiovascular exam  - AMB POC EKG ROUTINE W/ 12 LEADS, INTER & REP      Orders Placed This Encounter    HEMOGLOBIN A1C WITH EAG     Standing Status:   Future     Number of Occurrences:   1     Standing Expiration Date:   8/1/2022    CBC WITH AUTOMATED DIFF     Standing Status:   Future     Number of Occurrences:   1     Standing Expiration Date:   9/6/9516    METABOLIC PANEL, COMPREHENSIVE     Standing Status:   Future     Number of Occurrences:   1     Standing Expiration Date:   8/1/2022    AMB POC EKG ROUTINE W/ 12 LEADS, INTER & REP     Order Specific Question:   Reason for Exam:     Answer:   preop           HPI  71-year-old woman with a history of vascular disease specifically peripheral arterial disease, hypertension and diabetes arrives today for preoperative medical assessment prior to cataract surgery. Diabetes is reasonably well controlled with an A1c of 8.4 in June 2022. She has not had hypoglycemic events. Hypertension is well controlled with medication. She currently takes amlodipine, labetalol, and hydrochlorothiazide.   Peripheral arterial disease-history of penetrating ulcer of the aorta. She arrives in clinic today with 2 preoperative requests-1 for right shoulder surgery with Dr. Everlene Goodpasture in the second for eye muscle surgery at [facility]. She feels well denies chest pain, pressure or tightness, shortness of breath notes that she can walk several blocks without any symptoms. She does have a history of arterial stents in her lower extremities and takes aspirin and Plavix for this. ROS:  Denies  fever, chills, cough, chest pain, SOB,  nausea, vomiting, or diarrhea. Denies wt loss, wt gain, hemoptysis, hematochezia or melena. Physical Examination:    Visit Vitals  /76   Pulse 92   Temp 97.3 °F (36.3 °C) (Temporal)   Resp 18   Ht 5' 5\" (1.651 m)   Wt 178 lb 8 oz (81 kg)   SpO2 98%   BMI 29.70 kg/m²      General:  Alert, cooperative, no distress. Head:  Normocephalic, without obvious abnormality, atraumatic. Eyes:  Conjunctivae/corneas clear. Pupils equal, round, reactive to light. Extraocular movements intact. Lungs:   Clear to auscultation bilaterally. Chest wall:  No tenderness or deformity. Cardiac:  RRR   Abdomen:   Soft, non-tender. Bowel sounds normal. No masses. No organomegaly. Extremities: Extremities normal, atraumatic, no cyanosis or edema. Pulses: 2+ and symmetric all extremities. Skin: Skin color, texture, turgor normal. No rashes or lesions. Lymph nodes: Cervical, supraclavicular, and axillary nodes normal.   Neurologic: CNII-XII intact. Normal strength, sensation, and reflexes throughout. On this date 07/11/2022 I have spent 30 minutes reviewing previous notes, test results and face to face with the patient discussing the diagnosis and importance of compliance with the treatment plan as well as documenting on the day of the visit.     Murrell Najjar MD FACP    (signed electronically) on 7/11/2022 at 7:26 AM Never smoker

## 2022-07-17 ENCOUNTER — OUTPATIENT (OUTPATIENT)
Dept: INPATIENT UNIT | Facility: HOSPITAL | Age: 27
LOS: 1 days | Discharge: HOME | End: 2022-07-17

## 2022-07-17 VITALS
RESPIRATION RATE: 16 BRPM | DIASTOLIC BLOOD PRESSURE: 59 MMHG | TEMPERATURE: 98 F | SYSTOLIC BLOOD PRESSURE: 102 MMHG | HEART RATE: 85 BPM

## 2022-07-17 VITALS — SYSTOLIC BLOOD PRESSURE: 105 MMHG | HEART RATE: 75 BPM | DIASTOLIC BLOOD PRESSURE: 59 MMHG

## 2022-07-17 DIAGNOSIS — O26.899 OTHER SPECIFIED PREGNANCY RELATED CONDITIONS, UNSPECIFIED TRIMESTER: ICD-10-CM

## 2022-07-17 DIAGNOSIS — Z98.890 OTHER SPECIFIED POSTPROCEDURAL STATES: Chronic | ICD-10-CM

## 2022-07-17 DIAGNOSIS — Z3A.00 WEEKS OF GESTATION OF PREGNANCY NOT SPECIFIED: ICD-10-CM

## 2022-07-17 LAB
APPEARANCE UR: CLEAR — SIGNIFICANT CHANGE UP
BACTERIA # UR AUTO: ABNORMAL
BILIRUB UR-MCNC: NEGATIVE — SIGNIFICANT CHANGE UP
COLOR SPEC: YELLOW — SIGNIFICANT CHANGE UP
COMMENT - URINE: SIGNIFICANT CHANGE UP
DIFF PNL FLD: NEGATIVE — SIGNIFICANT CHANGE UP
EPI CELLS # UR: 4 /HPF — SIGNIFICANT CHANGE UP (ref 0–5)
GLUCOSE UR QL: NEGATIVE — SIGNIFICANT CHANGE UP
HYALINE CASTS # UR AUTO: 0 /LPF — SIGNIFICANT CHANGE UP (ref 0–7)
KETONES UR-MCNC: NEGATIVE — SIGNIFICANT CHANGE UP
LEUKOCYTE ESTERASE UR-ACNC: ABNORMAL
NITRITE UR-MCNC: NEGATIVE — SIGNIFICANT CHANGE UP
PH UR: 6.5 — SIGNIFICANT CHANGE UP (ref 5–8)
PROT UR-MCNC: ABNORMAL
RBC CASTS # UR COMP ASSIST: 3 /HPF — SIGNIFICANT CHANGE UP (ref 0–4)
SP GR SPEC: 1.03 — SIGNIFICANT CHANGE UP (ref 1–1.05)
UROBILINOGEN FLD QL: SIGNIFICANT CHANGE UP
WBC UR QL: 14 /HPF — HIGH (ref 0–5)

## 2022-07-17 PROCEDURE — 76818 FETAL BIOPHYS PROFILE W/NST: CPT | Mod: 26

## 2022-07-17 PROCEDURE — 76830 TRANSVAGINAL US NON-OB: CPT | Mod: 26

## 2022-07-17 NOTE — OB PROVIDER TRIAGE NOTE - NSHPPHYSICALEXAM_GEN_ALL_CORE
ICU Vital Signs Last 24 Hrs  T(C): 36.5 (17 Jul 2022 00:46), Max: 36.5 (17 Jul 2022 00:46)  T(F): 97.7 (17 Jul 2022 00:46), Max: 97.7 (17 Jul 2022 00:46)  HR: 75 (17 Jul 2022 02:05) (75 - 85)  BP: 105/59 (17 Jul 2022 02:05) (102/59 - 105/59)  BP(mean): --  ABP: --  ABP(mean): --  RR: 16 (17 Jul 2022 00:46) (16 - 16)  SpO2: --    Abdomen soft nontender  TAS: Baby A: Breech, MVP: 4.39  Baby B: Cephalic MVP: 3.3   Speculum: Cervix appears closed  TVS: CL: 3.3-3.5 no funneling or dynamic changes   FHR A: 140bpm, moderate variability   FHR B: 145bpm, moderate variability  No contractions on TOCO

## 2022-07-17 NOTE — OB RN TRIAGE NOTE - NSICDXPASTSURGICALHX_GEN_ALL_CORE_FT
PAST SURGICAL HISTORY:  H/O breast reconstruction 2019 s/p weight loss    History of gastric surgery 2014 gastric sleeve; lap band 2018

## 2022-07-17 NOTE — OB PROVIDER TRIAGE NOTE - NSOBPROVIDERNOTE_OBGYN_ALL_OB_FT
No evidence of  labor at this time. Discussed findings with Dr. Hood. Pt. d/c'd home. Pt. to follow up with next OB appointment. Pt. instructed to return to triage with increase abdominal contractions, leakage of fluid, vaginal bleeding or decrease fetal movement. No evidence of  labor at this time. Discussed findings with Dr. Hood. Plan for urine culture and d/c'd home. Pt. to follow up with next OB appointment. Pt. instructed to return to triage with increase abdominal contractions, leakage of fluid, vaginal bleeding or decrease fetal movement.

## 2022-07-17 NOTE — OB PROVIDER TRIAGE NOTE - NSHPLABSRESULTS_GEN_ALL_CORE
Urinalysis Basic - ( 2022 00:37 )    Color: Yellow / Appearance: Clear / S.027 / pH: x  Gluc: x / Ketone: Negative  / Bili: Negative / Urobili: <2 mg/dL   Blood: x / Protein: Trace / Nitrite: Negative   Leuk Esterase: Large / RBC: 3 /HPF / WBC 14 /HPF   Sq Epi: x / Non Sq Epi: 4 /HPF / Bacteria: Moderate

## 2022-07-17 NOTE — OB PROVIDER TRIAGE NOTE - HISTORY OF PRESENT ILLNESS
Pt. is a 28y/o  EGA 24.1wks with mono/di TIUPs reports of constant lower back pain and upper abdominal pain. Pt. denies leakage of fluid and vaginal bleeding. Pt. reports good fetal movement.     AP: Mono/Di TIUPS - Followed for fetal growth discordance of 21% of baby B    Medical Hx: Denies    Surgical Hx:   Gastric Sleeve 2014  Lap band 2018  Breast augmentation 2019  Hysteroscopic myomectomy    OBGYN Hx:    2021 5#9   HSV (Last outbreak 2022)  Hysteroscopic myomectomy 2020    Meds: ASA and PNV    Allergies: Doxycycline - Hives

## 2022-07-18 LAB
CULTURE RESULTS: SIGNIFICANT CHANGE UP
SPECIMEN SOURCE: SIGNIFICANT CHANGE UP

## 2022-07-21 ENCOUNTER — APPOINTMENT (OUTPATIENT)
Dept: ANTEPARTUM | Facility: CLINIC | Age: 27
End: 2022-07-21

## 2022-07-22 ENCOUNTER — APPOINTMENT (OUTPATIENT)
Dept: ANTEPARTUM | Facility: CLINIC | Age: 27
End: 2022-07-22

## 2022-07-22 ENCOUNTER — ASOB RESULT (OUTPATIENT)
Age: 27
End: 2022-07-22

## 2022-07-22 PROCEDURE — 76815 OB US LIMITED FETUS(S): CPT

## 2022-07-22 PROCEDURE — 76820 UMBILICAL ARTERY ECHO: CPT

## 2022-08-05 ENCOUNTER — ASOB RESULT (OUTPATIENT)
Age: 27
End: 2022-08-05

## 2022-08-05 ENCOUNTER — APPOINTMENT (OUTPATIENT)
Dept: ANTEPARTUM | Facility: CLINIC | Age: 27
End: 2022-08-05

## 2022-08-05 PROBLEM — U07.1 COVID-19: Chronic | Status: ACTIVE | Noted: 2022-07-17

## 2022-08-05 PROCEDURE — 76820 UMBILICAL ARTERY ECHO: CPT

## 2022-08-05 PROCEDURE — 76816 OB US FOLLOW-UP PER FETUS: CPT

## 2022-08-19 ENCOUNTER — APPOINTMENT (OUTPATIENT)
Dept: ANTEPARTUM | Facility: CLINIC | Age: 27
End: 2022-08-19

## 2022-08-19 ENCOUNTER — ASOB RESULT (OUTPATIENT)
Age: 27
End: 2022-08-19

## 2022-08-19 PROCEDURE — 99213 OFFICE O/P EST LOW 20 MIN: CPT | Mod: 25

## 2022-08-19 PROCEDURE — 76816 OB US FOLLOW-UP PER FETUS: CPT

## 2022-08-19 PROCEDURE — 76819 FETAL BIOPHYS PROFIL W/O NST: CPT

## 2022-08-19 PROCEDURE — 76820 UMBILICAL ARTERY ECHO: CPT

## 2022-08-22 DIAGNOSIS — O28.3 ABNORMAL ULTRASONIC FINDING ON ANTENATAL SCREENING OF MOTHER: ICD-10-CM

## 2022-08-23 ENCOUNTER — APPOINTMENT (OUTPATIENT)
Dept: OTHER | Facility: CLINIC | Age: 27
End: 2022-08-23

## 2022-08-23 ENCOUNTER — ASOB RESULT (OUTPATIENT)
Age: 27
End: 2022-08-23

## 2022-08-23 ENCOUNTER — APPOINTMENT (OUTPATIENT)
Dept: ANTEPARTUM | Facility: CLINIC | Age: 27
End: 2022-08-23

## 2022-08-23 PROCEDURE — 76820 UMBILICAL ARTERY ECHO: CPT | Mod: 59

## 2022-08-23 PROCEDURE — 99213 OFFICE O/P EST LOW 20 MIN: CPT | Mod: 25

## 2022-08-23 PROCEDURE — 76816 OB US FOLLOW-UP PER FETUS: CPT

## 2022-08-23 PROCEDURE — 76819 FETAL BIOPHYS PROFIL W/O NST: CPT

## 2022-08-23 PROCEDURE — 99203 OFFICE O/P NEW LOW 30 MIN: CPT | Mod: 95

## 2022-08-23 PROCEDURE — 76819 FETAL BIOPHYS PROFIL W/O NST: CPT | Mod: 59

## 2022-08-23 PROCEDURE — 76816 OB US FOLLOW-UP PER FETUS: CPT | Mod: 59

## 2022-08-23 PROCEDURE — 76820 UMBILICAL ARTERY ECHO: CPT

## 2022-08-26 ENCOUNTER — ASOB RESULT (OUTPATIENT)
Age: 27
End: 2022-08-26

## 2022-08-26 ENCOUNTER — APPOINTMENT (OUTPATIENT)
Dept: ANTEPARTUM | Facility: CLINIC | Age: 27
End: 2022-08-26

## 2022-08-26 PROCEDURE — 76820 UMBILICAL ARTERY ECHO: CPT | Mod: 59

## 2022-08-26 PROCEDURE — 76818 FETAL BIOPHYS PROFILE W/NST: CPT | Mod: 59

## 2022-08-26 PROCEDURE — 76818 FETAL BIOPHYS PROFILE W/NST: CPT

## 2022-08-29 ENCOUNTER — ASOB RESULT (OUTPATIENT)
Age: 27
End: 2022-08-29

## 2022-08-29 ENCOUNTER — APPOINTMENT (OUTPATIENT)
Dept: ANTEPARTUM | Facility: CLINIC | Age: 27
End: 2022-08-29

## 2022-08-29 PROCEDURE — 76818 FETAL BIOPHYS PROFILE W/NST: CPT | Mod: 59

## 2022-08-29 PROCEDURE — 76820 UMBILICAL ARTERY ECHO: CPT | Mod: 59

## 2022-08-29 PROCEDURE — 76820 UMBILICAL ARTERY ECHO: CPT

## 2022-09-01 ENCOUNTER — APPOINTMENT (OUTPATIENT)
Dept: ANTEPARTUM | Facility: CLINIC | Age: 27
End: 2022-09-01

## 2022-09-01 ENCOUNTER — ASOB RESULT (OUTPATIENT)
Age: 27
End: 2022-09-01

## 2022-09-01 PROCEDURE — 76820 UMBILICAL ARTERY ECHO: CPT

## 2022-09-01 PROCEDURE — 76818 FETAL BIOPHYS PROFILE W/NST: CPT

## 2022-09-01 PROCEDURE — 76818 FETAL BIOPHYS PROFILE W/NST: CPT | Mod: 59

## 2022-09-04 ENCOUNTER — OUTPATIENT (OUTPATIENT)
Dept: INPATIENT UNIT | Facility: HOSPITAL | Age: 27
LOS: 1 days | Discharge: HOME | End: 2022-09-04

## 2022-09-04 VITALS — DIASTOLIC BLOOD PRESSURE: 67 MMHG | SYSTOLIC BLOOD PRESSURE: 115 MMHG | HEART RATE: 63 BPM

## 2022-09-04 VITALS
TEMPERATURE: 99 F | DIASTOLIC BLOOD PRESSURE: 72 MMHG | SYSTOLIC BLOOD PRESSURE: 113 MMHG | RESPIRATION RATE: 16 BRPM | HEART RATE: 85 BPM

## 2022-09-04 DIAGNOSIS — O26.899 OTHER SPECIFIED PREGNANCY RELATED CONDITIONS, UNSPECIFIED TRIMESTER: ICD-10-CM

## 2022-09-04 DIAGNOSIS — Z98.890 OTHER SPECIFIED POSTPROCEDURAL STATES: Chronic | ICD-10-CM

## 2022-09-04 DIAGNOSIS — Z3A.00 WEEKS OF GESTATION OF PREGNANCY NOT SPECIFIED: ICD-10-CM

## 2022-09-04 PROCEDURE — 99214 OFFICE O/P EST MOD 30 MIN: CPT

## 2022-09-04 PROCEDURE — 76818 FETAL BIOPHYS PROFILE W/NST: CPT | Mod: 26

## 2022-09-04 NOTE — OB PROVIDER TRIAGE NOTE - NSICDXPASTSURGICALHX_GEN_ALL_CORE_FT
PAST SURGICAL HISTORY:  H/O breast reconstruction 2019 s/p weight loss    History of D&C     History of gastric surgery 2014 gastric sleeve; lap band 2018

## 2022-09-04 NOTE — OB PROVIDER TRIAGE NOTE - NSICDXPASTMEDICALHX_GEN_ALL_CORE_FT
PAST MEDICAL HISTORY:  2019 novel coronavirus disease (COVID-19) 3/2020 & 12/2021    Adenomyosis     Anemia

## 2022-09-04 NOTE — OB PROVIDER TRIAGE NOTE - PLAN OF CARE
Plan d/w Dr. Avalos  patient to be discharged home   PTL instructions reviewed  Fetal kick counts reviewed  patient to follow up at next scheduled appointment on Tuesday Plan d/w Dr. Avalos  patient to be discharged home   PTL instructions reviewed  strict Fetal kick counts reviewed  patient to follow up at next scheduled appointment on Tuesday

## 2022-09-04 NOTE — OB PROVIDER TRIAGE NOTE - HISTORY OF PRESENT ILLNESS
27 year old  at 31.1 with mono-di TIUP presented to triage with referral for NST. Patient states that baby B has decreased blood flow and was instructed to come for NST on either  or monday. Patient denies contractions, cramping, leaking of fluid, vaginal bleeding or decreased fetal movement.   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            fibroids   PSH: Lap band          gastric sleeve          breast lift and reduction           D&C   Meds: Baby asa, pnv, folic acid, iron infusions  Allergies: Tetracycline   Denies hx of anxiety, depression, pp depression  denies use of etoh, drugs, tobacco during pregnancy

## 2022-09-04 NOTE — OB PROVIDER TRIAGE NOTE - NS_OBGYNHISTORY_OBGYN_ALL_OB_FT
AP course complicated by mono-di tiup, IUGR of Baby B   OB: SAB x1 with D&C         2021 FT 5#9  GYN: Adenomyosis           D&C         HSV 2          fibroids    GBS unk.

## 2022-09-04 NOTE — OB RN TRIAGE NOTE - FALL HARM RISK - UNIVERSAL INTERVENTIONS
Bed in lowest position, wheels locked, appropriate side rails in place/Call bell, personal items and telephone in reach/Instruct patient to call for assistance before getting out of bed or chair/Non-slip footwear when patient is out of bed/[place] to call system/Physically safe environment - no spills, clutter or unnecessary equipment/Purposeful Proactive Rounding/Room/bathroom lighting operational, light cord in reach

## 2022-09-04 NOTE — OB PROVIDER TRIAGE NOTE - NS_SONONOTE_OBGYN_ALL_OB_FT
Baby A: bpp 8/8, baby B bpp 8/8 Baby A: bpp 8/8 good fetal movement noted, baby B bpp 8/8 good fetal movement noted

## 2022-09-04 NOTE — OB PROVIDER TRIAGE NOTE - NSOBPROVIDERNOTE_OBGYN_ALL_OB_FT
9910: Tracing reviewed by Dr. Gordon service attending.   Fetal statuses reassuring     plan D/w Dr. Avalos  patient to be discharged home   follow up on Tuesday at next scheduled appointment

## 2022-09-04 NOTE — OB RN TRIAGE NOTE - NSMATERNALFETALCONCERNS_OBGYN_ALL_OB_FT
Maternal/Fetal Alert  mono di twins   twin B IUGR  delivery planning 32-34 weeks   ALERT NICU   Valentine Combs RN 8/22/2022

## 2022-09-04 NOTE — OB PROVIDER TRIAGE NOTE - ADDITIONAL INSTRUCTIONS
Return to hospital for contractions, leaking of fluid, vaginal bleeding, decreased fetal movement.   Fetal kick counts reviewed  Follow up at next scheduled appointment on Tuesday.

## 2022-09-04 NOTE — OB PROVIDER TRIAGE NOTE - NSHPPHYSICALEXAM_GEN_ALL_CORE
Vital Signs Last 24 Hrs  T(C): 37.0 (04 Sep 2022 21:15), Max: 37 (04 Sep 2022 20:57)  T(F): 98.6 (04 Sep 2022 21:15), Max: 98.6 (04 Sep 2022 20:57)  HR: 80 (04 Sep 2022 21:15) (80 - 85)  BP: 130/57 (04 Sep 2022 21:15) (113/72 - 130/57)  BP(mean): --  RR: 16 (04 Sep 2022 20:57) (16 - 16)  SpO2: --    Parameters below as of 04 Sep 2022 20:57  Patient On (Oxygen Delivery Method): room air    Assessment reveals VSS  Abdomen soft, NT, gravid  Cat 1 tracing, no contractions noted on toco   Transabdominal Ultrasound- Baby A: BPP 8/8  Sterile Speculum-  Transvaginal Ultrasound-  Vaginal Exam-   A&Ox3  Lungs- clear bilateral  Heart- normal rate and rhytmn      PLAN: Vital Signs Last 24 Hrs  T(C): 37.0 (04 Sep 2022 21:15), Max: 37 (04 Sep 2022 20:57)  T(F): 98.6 (04 Sep 2022 21:15), Max: 98.6 (04 Sep 2022 20:57)  HR: 80 (04 Sep 2022 21:15) (80 - 85)  BP: 130/57 (04 Sep 2022 21:15) (113/72 - 130/57)  BP(mean): --  RR: 16 (04 Sep 2022 20:57) (16 - 16)  SpO2: --    Parameters below as of 04 Sep 2022 20:57  Patient On (Oxygen Delivery Method): room air    Assessment reveals VSS  Abdomen soft, NT, gravid  Cat 1 tracing Baby A:  moderate variability with accels no decels. Baby B: 125 moderate variability with accels no decels   no contractions noted on toco   Transabdominal Ultrasound-Anterior placenta, Baby A: BPP 8/8, MVP 3.42, cephalic. Baby B: BPP 8/8, MVP 3.86, transverse   A&Ox3  Lungs- clear bilateral  Heart- normal rate and rhythm

## 2022-09-06 ENCOUNTER — APPOINTMENT (OUTPATIENT)
Dept: ANTEPARTUM | Facility: CLINIC | Age: 27
End: 2022-09-06

## 2022-09-06 ENCOUNTER — ASOB RESULT (OUTPATIENT)
Age: 27
End: 2022-09-06

## 2022-09-06 PROCEDURE — 76818 FETAL BIOPHYS PROFILE W/NST: CPT

## 2022-09-06 PROCEDURE — 76816 OB US FOLLOW-UP PER FETUS: CPT

## 2022-09-06 PROCEDURE — 76820 UMBILICAL ARTERY ECHO: CPT | Mod: 59

## 2022-09-06 PROCEDURE — 76816 OB US FOLLOW-UP PER FETUS: CPT | Mod: 59

## 2022-09-09 ENCOUNTER — ASOB RESULT (OUTPATIENT)
Age: 27
End: 2022-09-09

## 2022-09-09 ENCOUNTER — APPOINTMENT (OUTPATIENT)
Dept: ANTEPARTUM | Facility: CLINIC | Age: 27
End: 2022-09-09

## 2022-09-09 PROBLEM — N80.0 ENDOMETRIOSIS OF UTERUS: Chronic | Status: ACTIVE | Noted: 2022-09-04

## 2022-09-09 PROCEDURE — 76818 FETAL BIOPHYS PROFILE W/NST: CPT | Mod: 59

## 2022-09-09 PROCEDURE — 76821 MIDDLE CEREBRAL ARTERY ECHO: CPT

## 2022-09-09 PROCEDURE — 76818 FETAL BIOPHYS PROFILE W/NST: CPT

## 2022-09-09 PROCEDURE — 76820 UMBILICAL ARTERY ECHO: CPT | Mod: 59

## 2022-09-13 ENCOUNTER — APPOINTMENT (OUTPATIENT)
Dept: ANTEPARTUM | Facility: CLINIC | Age: 27
End: 2022-09-13

## 2022-09-13 ENCOUNTER — ASOB RESULT (OUTPATIENT)
Age: 27
End: 2022-09-13

## 2022-09-13 PROCEDURE — 76821 MIDDLE CEREBRAL ARTERY ECHO: CPT

## 2022-09-13 PROCEDURE — 76820 UMBILICAL ARTERY ECHO: CPT | Mod: 59

## 2022-09-13 PROCEDURE — 76818 FETAL BIOPHYS PROFILE W/NST: CPT | Mod: 59

## 2022-09-13 PROCEDURE — 76818 FETAL BIOPHYS PROFILE W/NST: CPT

## 2022-09-16 ENCOUNTER — APPOINTMENT (OUTPATIENT)
Dept: ANTEPARTUM | Facility: CLINIC | Age: 27
End: 2022-09-16

## 2022-09-16 ENCOUNTER — ASOB RESULT (OUTPATIENT)
Age: 27
End: 2022-09-16

## 2022-09-16 PROCEDURE — 76820 UMBILICAL ARTERY ECHO: CPT | Mod: 59

## 2022-09-16 PROCEDURE — 76821 MIDDLE CEREBRAL ARTERY ECHO: CPT

## 2022-09-16 PROCEDURE — 76818 FETAL BIOPHYS PROFILE W/NST: CPT

## 2022-09-16 PROCEDURE — 76818 FETAL BIOPHYS PROFILE W/NST: CPT | Mod: 59

## 2022-09-20 ENCOUNTER — ASOB RESULT (OUTPATIENT)
Age: 27
End: 2022-09-20

## 2022-09-20 ENCOUNTER — APPOINTMENT (OUTPATIENT)
Dept: ANTEPARTUM | Facility: CLINIC | Age: 27
End: 2022-09-20

## 2022-09-20 PROCEDURE — 76820 UMBILICAL ARTERY ECHO: CPT | Mod: 59

## 2022-09-20 PROCEDURE — 76818 FETAL BIOPHYS PROFILE W/NST: CPT | Mod: 59

## 2022-09-20 PROCEDURE — 76821 MIDDLE CEREBRAL ARTERY ECHO: CPT

## 2022-09-20 PROCEDURE — 76821 MIDDLE CEREBRAL ARTERY ECHO: CPT | Mod: 59

## 2022-09-20 PROCEDURE — 76816 OB US FOLLOW-UP PER FETUS: CPT | Mod: 59

## 2022-09-20 PROCEDURE — 76816 OB US FOLLOW-UP PER FETUS: CPT

## 2022-09-21 ENCOUNTER — NON-APPOINTMENT (OUTPATIENT)
Age: 27
End: 2022-09-21

## 2022-09-22 ENCOUNTER — OUTPATIENT (OUTPATIENT)
Dept: OUTPATIENT SERVICES | Facility: HOSPITAL | Age: 27
LOS: 1 days | End: 2022-09-22

## 2022-09-22 VITALS — WEIGHT: 195.11 LBS | HEIGHT: 64.5 IN

## 2022-09-22 DIAGNOSIS — O30.033 TWIN PREGNANCY, MONOCHORIONIC/DIAMNIOTIC, THIRD TRIMESTER: ICD-10-CM

## 2022-09-22 DIAGNOSIS — B00.9 HERPESVIRAL INFECTION, UNSPECIFIED: ICD-10-CM

## 2022-09-22 DIAGNOSIS — Z98.890 OTHER SPECIFIED POSTPROCEDURAL STATES: Chronic | ICD-10-CM

## 2022-09-22 LAB
APPEARANCE UR: CLEAR — SIGNIFICANT CHANGE UP
BACTERIA # UR AUTO: ABNORMAL
BILIRUB UR-MCNC: NEGATIVE — SIGNIFICANT CHANGE UP
BLD GP AB SCN SERPL QL: NEGATIVE — SIGNIFICANT CHANGE UP
COLOR SPEC: YELLOW — SIGNIFICANT CHANGE UP
DIFF PNL FLD: NEGATIVE — SIGNIFICANT CHANGE UP
EPI CELLS # UR: 9 /HPF — HIGH (ref 0–5)
GLUCOSE UR QL: NEGATIVE — SIGNIFICANT CHANGE UP
HCT VFR BLD CALC: 32.5 % — LOW (ref 34.5–45)
HGB BLD-MCNC: 10.4 G/DL — LOW (ref 11.5–15.5)
KETONES UR-MCNC: NEGATIVE — SIGNIFICANT CHANGE UP
LEUKOCYTE ESTERASE UR-ACNC: ABNORMAL
MCHC RBC-ENTMCNC: 28.2 PG — SIGNIFICANT CHANGE UP (ref 27–34)
MCHC RBC-ENTMCNC: 32 GM/DL — SIGNIFICANT CHANGE UP (ref 32–36)
MCV RBC AUTO: 88.1 FL — SIGNIFICANT CHANGE UP (ref 80–100)
NITRITE UR-MCNC: NEGATIVE — SIGNIFICANT CHANGE UP
NRBC # BLD: 0 /100 WBCS — SIGNIFICANT CHANGE UP (ref 0–0)
NRBC # FLD: 0 K/UL — SIGNIFICANT CHANGE UP (ref 0–0)
PH UR: 6.5 — SIGNIFICANT CHANGE UP (ref 5–8)
PLATELET # BLD AUTO: 191 K/UL — SIGNIFICANT CHANGE UP (ref 150–400)
PROT UR-MCNC: ABNORMAL
RBC # BLD: 3.69 M/UL — LOW (ref 3.8–5.2)
RBC # FLD: SIGNIFICANT CHANGE UP (ref 10.3–14.5)
RBC CASTS # UR COMP ASSIST: 4 /HPF — SIGNIFICANT CHANGE UP (ref 0–4)
RH IG SCN BLD-IMP: POSITIVE — SIGNIFICANT CHANGE UP
SP GR SPEC: 1.03 — SIGNIFICANT CHANGE UP (ref 1.01–1.05)
UROBILINOGEN FLD QL: ABNORMAL
WBC # BLD: 7.29 K/UL — SIGNIFICANT CHANGE UP (ref 3.8–10.5)
WBC # FLD AUTO: 7.29 K/UL — SIGNIFICANT CHANGE UP (ref 3.8–10.5)
WBC UR QL: 7 /HPF — HIGH (ref 0–5)

## 2022-09-22 RX ORDER — FOLIC ACID 0.8 MG
0 TABLET ORAL
Qty: 0 | Refills: 0 | DISCHARGE

## 2022-09-22 NOTE — OB PST NOTE - NS_CONSENTSAPP_OBGYN_ALL_OB
75 
No

## 2022-09-22 NOTE — OB PST NOTE - PROBLEM SELECTOR PLAN 1
Patient is tentatively scheduled for Primary  section with Dr Candelario on 2022.    Pre-op instructions provided. Pt given verbal and written instructions with teach back on chlorhexidine wash. Pt verbalized understanding with return demonstration.    Routine Covid PCR test ordered .Instructions regarding covid PCR test and locations for covid testing site provided. Pt verbalized understanding    Instructed pt to watch pain/anesthesia videos prior to surgery.

## 2022-09-22 NOTE — OB PST NOTE - NS_OBGYNHISTORY_OBGYN_ALL_OB_FT
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

## 2022-09-22 NOTE — OB PST NOTE - ALERT: PERTINENT HISTORY
1st Trimester Sonogram/20 Week Level II Sonogram Fetal Sonogram/1st Trimester Sonogram/20 Week Level II Sonogram/BioPhysical Profile(s)/Fetal Non-Stress Test (NST)

## 2022-09-22 NOTE — OB PST NOTE - FALL HARM RISK - UNIVERSAL INTERVENTIONS
Bed in lowest position, wheels locked, appropriate side rails in place/Call bell, personal items and telephone in reach/Instruct patient to call for assistance before getting out of bed or chair/Non-slip footwear when patient is out of bed/[place] to call system/Physically safe environment - no spills, clutter or unnecessary equipment/Purposeful Proactive Rounding/Room/bathroom lighting operational, light cord in reach

## 2022-09-22 NOTE — OB PST NOTE - NSHPPHYSICALEXAM_GEN_ALL_CORE
Constitutional: well developed, well groomed, well nourished, no distress    Eyes: PERRL, EOMI, conjunctiva clear    Ears: normal    Mouth and Gums: normal, moist    Pharynx: no tenderness, discharge, or peritonsillar abscess    Tonsils: no redness, discharge, tenderness, or swelling    Neck: supple, no JVD, normal thyroid gland, no cervical or paraspinal tenderness    Breast: normal shape    Back: normal shape, ROM intact, strength intact, no vertebral tenderness    Respiratory: airway patent, breast sounds equal, good air movement, respiration non-labored, clear to auscultation bilaterally, no chest wall tenderness, no wheezes, rhonchi, or rales    Cardiovascular: regular rate and rhythm, no rubs, murmur, normal PMI    Gastrointestinal: gravid abdomen, non tender, normal bowel sounds    Extremities: no clubbing, cyanosis, +1 pedal edema    Vascular: carotid pulse normal, radial pulse normal, DP pulse normal, PT pulse normal    Neurological: alert and oriented x3, sensation intact, cranial nerves intact, normal strength    Skin: warm and dry, normal color    Lymph nodes: no anterior cervical lymphadenopathy    Musculoskeletal: ROM intact, normal strength, no joint swelling, warmth, or calf tenderness    Psychiatric: normal affect, normal behavior

## 2022-09-22 NOTE — OB PST NOTE - NSHPREVIEWOFSYSTEMS_GEN_ALL_CORE
General: no fever, chills, sweating, anorexia, or weight loss    Skin: no rashes, itching, or dryness    Breast: no tenderness, lumps, or nipple discharge    Ophtalmologic: no diplopia, photophobia, or blurred vision    ENMT: no hearing difficulty, ear pain, tinnitus, or vertigo. No sinus symptoms, nasal congestion, nasal discharge, or nasal obstruction    Respiratory and Thorax: no wheezing, dyspnea, or cough    Cardiovascular: no chest pain, palpitations, dyspnea on exertion, orthopnea, or peripheral edema    Gastrointestinal: no nausea, vomiting, diarrhea, constipation, change in bowel movements, or abdominal pain    Genitourinary and Pelvis: no hematuria, renal colic, flank pain, dysuria, nocturia. No abnormal vaginal bleeding, vaginal discharge, spotting, pelvic pain, or vaginal leakage, pt reports of Herpes genitalis infection on valtrex since yesterday    Musculoskeletal: no arthralgia, arthritis, joint swelling, muscle cramping, muscle weakness, neck pain, arm pain, or leg pain    Neurological: no transient paralysis, weakness, paresthesias, or seizures. No syncope, tremors, vertigo, loss of sensation, difficulty walking, loss of consciousness    Psychiatric: no suicidal ideation, depression, anxiety    Hematology: no nose bleeding, easy bruising or bleeding, or skin lumps    Lymphatic: no enlarged or tender lymph nodes, or extremity swelling    Endocrine: no heat or cold intolerance    Immunologic: no recurrent or persistent infections

## 2022-09-22 NOTE — OB PST NOTE - HISTORY OF PRESENT ILLNESS
27 year old pregnant female presents to presurgical testing scheduled for primary   section. Patient denies vaginal  bleeding, spotting or leakage of amniotic fluid. Patient denies regular contractions. Patient reports positive fetal movement.      mono di twins   twin B IUGR  delivery planning 32-34 weeks  27 year old pregnant female , @ 34 weeks, presents to presurgical testing scheduled for primary   section. Patient denies vaginal  bleeding, spotting or leakage of amniotic fluid. Patient denies regular contractions. Patient reports positive fetal movement.      mono di twins   twin B IUGR  delivery planning 32-34 weeks

## 2022-09-22 NOTE — OB PST NOTE - NSICDXPASTMEDICALHX_GEN_ALL_CORE_FT
PAST MEDICAL HISTORY:  2019 novel coronavirus disease (COVID-19) 3/2020 & 12/2021    Adenomyosis     Anemia s/p iron infusion x 8 on 08/2022    HSV-2 infection

## 2022-09-23 ENCOUNTER — APPOINTMENT (OUTPATIENT)
Dept: ANTEPARTUM | Facility: CLINIC | Age: 27
End: 2022-09-23

## 2022-09-23 ENCOUNTER — ASOB RESULT (OUTPATIENT)
Age: 27
End: 2022-09-23

## 2022-09-23 PROCEDURE — 76821 MIDDLE CEREBRAL ARTERY ECHO: CPT | Mod: 59

## 2022-09-23 PROCEDURE — 76818 FETAL BIOPHYS PROFILE W/NST: CPT

## 2022-09-23 PROCEDURE — 76820 UMBILICAL ARTERY ECHO: CPT | Mod: 59

## 2022-09-23 PROCEDURE — 76821 MIDDLE CEREBRAL ARTERY ECHO: CPT

## 2022-09-25 ENCOUNTER — OUTPATIENT (OUTPATIENT)
Dept: INPATIENT UNIT | Facility: HOSPITAL | Age: 27
LOS: 1 days | Discharge: HOME | End: 2022-09-25

## 2022-09-25 VITALS — SYSTOLIC BLOOD PRESSURE: 93 MMHG | DIASTOLIC BLOOD PRESSURE: 56 MMHG | HEART RATE: 73 BPM

## 2022-09-25 VITALS
DIASTOLIC BLOOD PRESSURE: 68 MMHG | RESPIRATION RATE: 14 BRPM | HEART RATE: 89 BPM | TEMPERATURE: 99 F | SYSTOLIC BLOOD PRESSURE: 102 MMHG

## 2022-09-25 DIAGNOSIS — Z3A.00 WEEKS OF GESTATION OF PREGNANCY NOT SPECIFIED: ICD-10-CM

## 2022-09-25 DIAGNOSIS — Z98.890 OTHER SPECIFIED POSTPROCEDURAL STATES: Chronic | ICD-10-CM

## 2022-09-25 DIAGNOSIS — O26.899 OTHER SPECIFIED PREGNANCY RELATED CONDITIONS, UNSPECIFIED TRIMESTER: ICD-10-CM

## 2022-09-25 PROBLEM — D64.9 ANEMIA, UNSPECIFIED: Chronic | Status: ACTIVE | Noted: 2022-09-04

## 2022-09-25 PROCEDURE — 99212 OFFICE O/P EST SF 10 MIN: CPT

## 2022-09-25 NOTE — OB PROVIDER TRIAGE NOTE - NSICDXPASTSURGICALHX_GEN_ALL_CORE_FT
PAST SURGICAL HISTORY:  H/O breast reconstruction 2019 s/p weight loss    H/O dilation and curettage     History of D&C not related to pregnancy    History of gastric surgery 2014 gastric sleeve; lap band 2018

## 2022-09-25 NOTE — OB PROVIDER TRIAGE NOTE - PLAN OF CARE
case d/w Dr Ballard  tracing  reviewed with Dr Ballard.   will be for CS on 9/29     advised to get COVID  testing preop    s/s of labor reviewed.  FM counts reviewed   verbalized understanding  and written discharge instructions given

## 2022-09-25 NOTE — OB PROVIDER TRIAGE NOTE - HISTORY OF PRESENT ILLNESS
27 year old female  at 34.1 weeks gestation  TIUP ( MONO/DI)  who presents for NST/BPP followed for growth on Twin B    stated that she is scheduled for Primary CS on  (35 weeks)    feels GFM x2    denied any contractions  LOF VB       med hx denied   surghx  lap band               breast reduction   meds  pnvqd             ASA daily            folic acid    Valtrex 500mgs  bid   OB hx  FT   5.9#   gyn hx HSV no outbreaks  on suppression    27 year old female  at 34.1 weeks gestation  TIUP ( MONO/DI)  who presents for NST/BPP followed for growth on Twin B ( IUGR and delivery recommended )    stated that she is scheduled for Primary CS on  (35 weeks)    feels GFM x2    denied any contractions  LOF VB       med hx denied   surghx  lap band               breast reduction   meds  pnvqd             ASA daily            folic acid    Valtrex 500mgs  bid   OB hx  FT   5.9#   gyn hx HSV no outbreaks  on suppression

## 2022-09-25 NOTE — OB PROVIDER TRIAGE NOTE - ADDITIONAL INSTRUCTIONS
case d/w Dr Ballard  tracing  reviewed with Dr Ballard   will be for CS on 9/29     advised to get COVID  testing preop    s/s of labor reviewed   FM counts reviewed   verbalized understanding  and written discharge instructions given

## 2022-09-25 NOTE — OB PROVIDER TRIAGE NOTE - NSHPPHYSICALEXAM_GEN_ALL_CORE
pt seen and examined    ICU Vital Signs Last 24 Hrs  T(C): 37.3 (25 Sep 2022 07:32), Max: 37.3 (25 Sep 2022 07:32)  T(F): 99.1 (25 Sep 2022 07:32), Max: 99.1 (25 Sep 2022 07:32)  HR: 73 (25 Sep 2022 08:44) (73 - 89)  BP: 93/56 (25 Sep 2022 08:44) (93/56 - 102/68)  BP(mean): --  ABP: --  ABP(mean): --  RR: 14 (25 Sep 2022 07:32) (14 - 14)  SpO2: --    pt in nad   lungs clear   heart s1 s2   abd soft  non tender   placed on EFM    FHR twin A  135                             FHR  twin B  140   scan vertex /vertex  anterior placenta   BPP 8/8 x2

## 2022-09-25 NOTE — OB PROVIDER TRIAGE NOTE - NSOBPROVIDERNOTE_OBGYN_ALL_OB_FT
27 year old female P0 at 34.1 weeks TIUP ( MONO/DI )   NST reactive x1   BPP x2     case d/w Dr Ballard  tracing  reviewed with Dr Ballard   will be for CS on 9/29     advised to get COVID  testing preop    s/s of labor reviewed   FM counts reviewed   verbalized understanding  and written discharge instructions given     Kerri MCDONALD 27 year old female P0 at 34.1 weeks TIUP ( MONO/DI )   NST reactive x1   BPP x2     case d/w Dr Ballard  tracing  reviewed with Dr Ballard   will be for scheduled Primary CS on 9/29   advised to get COVID  testing preop    s/s of labor reviewed   FM counts reviewed   verbalized understanding  and written discharge instructions given     Kerri MCDONALD

## 2022-09-25 NOTE — OB RN TRIAGE NOTE - NSICDXPASTSURGICALHX_GEN_ALL_CORE_FT
PAST SURGICAL HISTORY:  H/O breast reconstruction 2019 s/p weight loss    History of gastric surgery 2014 gastric sleeve; lap band 2018     PAST SURGICAL HISTORY:  H/O breast reconstruction 2019 s/p weight loss    H/O dilation and curettage     History of D&C not related to pregnancy    History of gastric surgery 2014 gastric sleeve; lap band 2018

## 2022-09-25 NOTE — OB RN TRIAGE NOTE - FALL HARM RISK - UNIVERSAL INTERVENTIONS
Bed in lowest position, wheels locked, appropriate side rails in place/Call bell, personal items and telephone in reach/Instruct patient to call for assistance before getting out of bed or chair/Non-slip footwear when patient is out of bed/[place] to call system/Physically safe environment - no spills, clutter or unnecessary equipment/Purposeful Proactive Rounding/Room/bathroom lighting operational, light cord in reach

## 2022-09-26 ENCOUNTER — NON-APPOINTMENT (OUTPATIENT)
Age: 27
End: 2022-09-26

## 2022-09-28 ENCOUNTER — TRANSCRIPTION ENCOUNTER (OUTPATIENT)
Age: 27
End: 2022-09-28

## 2022-09-28 RX ORDER — FAMOTIDINE 10 MG/ML
20 INJECTION INTRAVENOUS ONCE
Refills: 0 | Status: DISCONTINUED | OUTPATIENT
Start: 2022-09-28 | End: 2022-10-09

## 2022-09-29 ENCOUNTER — INPATIENT (INPATIENT)
Facility: HOSPITAL | Age: 27
LOS: 2 days | Discharge: HOME | End: 2022-10-02
Attending: SPECIALIST | Admitting: SPECIALIST

## 2022-09-29 ENCOUNTER — RESULT REVIEW (OUTPATIENT)
Age: 27
End: 2022-09-29

## 2022-09-29 ENCOUNTER — TRANSCRIPTION ENCOUNTER (OUTPATIENT)
Age: 27
End: 2022-09-29

## 2022-09-29 VITALS — TEMPERATURE: 98 F

## 2022-09-29 DIAGNOSIS — O30.033 TWIN PREGNANCY, MONOCHORIONIC/DIAMNIOTIC, THIRD TRIMESTER: ICD-10-CM

## 2022-09-29 DIAGNOSIS — Z98.890 OTHER SPECIFIED POSTPROCEDURAL STATES: Chronic | ICD-10-CM

## 2022-09-29 LAB
BASOPHILS # BLD AUTO: 0.02 K/UL — SIGNIFICANT CHANGE UP (ref 0–0.2)
BASOPHILS NFR BLD AUTO: 0.2 % — SIGNIFICANT CHANGE UP (ref 0–2)
BLD GP AB SCN SERPL QL: NEGATIVE — SIGNIFICANT CHANGE UP
COVID-19 SPIKE DOMAIN AB INTERP: POSITIVE
COVID-19 SPIKE DOMAIN ANTIBODY RESULT: >250 U/ML — HIGH
EOSINOPHIL # BLD AUTO: 0.04 K/UL — SIGNIFICANT CHANGE UP (ref 0–0.5)
EOSINOPHIL NFR BLD AUTO: 0.5 % — SIGNIFICANT CHANGE UP (ref 0–6)
HCT VFR BLD CALC: 33.3 % — LOW (ref 34.5–45)
HGB BLD-MCNC: 10.8 G/DL — LOW (ref 11.5–15.5)
IANC: 6.13 K/UL — SIGNIFICANT CHANGE UP (ref 1.8–7.4)
IMM GRANULOCYTES NFR BLD AUTO: 0.2 % — SIGNIFICANT CHANGE UP (ref 0–0.9)
LYMPHOCYTES # BLD AUTO: 1.5 K/UL — SIGNIFICANT CHANGE UP (ref 1–3.3)
LYMPHOCYTES # BLD AUTO: 18.5 % — SIGNIFICANT CHANGE UP (ref 13–44)
MCHC RBC-ENTMCNC: 28.3 PG — SIGNIFICANT CHANGE UP (ref 27–34)
MCHC RBC-ENTMCNC: 32.4 GM/DL — SIGNIFICANT CHANGE UP (ref 32–36)
MCV RBC AUTO: 87.2 FL — SIGNIFICANT CHANGE UP (ref 80–100)
MONOCYTES # BLD AUTO: 0.4 K/UL — SIGNIFICANT CHANGE UP (ref 0–0.9)
MONOCYTES NFR BLD AUTO: 4.9 % — SIGNIFICANT CHANGE UP (ref 2–14)
NEUTROPHILS # BLD AUTO: 6.13 K/UL — SIGNIFICANT CHANGE UP (ref 1.8–7.4)
NEUTROPHILS NFR BLD AUTO: 75.7 % — SIGNIFICANT CHANGE UP (ref 43–77)
NRBC # BLD: 0 /100 WBCS — SIGNIFICANT CHANGE UP (ref 0–0)
NRBC # FLD: 0 K/UL — SIGNIFICANT CHANGE UP (ref 0–0)
PLATELET # BLD AUTO: 167 K/UL — SIGNIFICANT CHANGE UP (ref 150–400)
RBC # BLD: 3.82 M/UL — SIGNIFICANT CHANGE UP (ref 3.8–5.2)
RBC # FLD: SIGNIFICANT CHANGE UP (ref 10.3–14.5)
RH IG SCN BLD-IMP: POSITIVE — SIGNIFICANT CHANGE UP
SARS-COV-2 IGG+IGM SERPL QL IA: >250 U/ML — HIGH
SARS-COV-2 IGG+IGM SERPL QL IA: POSITIVE
T PALLIDUM AB TITR SER: NEGATIVE — SIGNIFICANT CHANGE UP
WBC # BLD: 8.11 K/UL — SIGNIFICANT CHANGE UP (ref 3.8–10.5)
WBC # FLD AUTO: 8.11 K/UL — SIGNIFICANT CHANGE UP (ref 3.8–10.5)

## 2022-09-29 RX ORDER — OXYCODONE HYDROCHLORIDE 5 MG/1
5 TABLET ORAL
Refills: 0 | Status: DISCONTINUED | OUTPATIENT
Start: 2022-09-29 | End: 2022-09-30

## 2022-09-29 RX ORDER — LANOLIN
1 OINTMENT (GRAM) TOPICAL EVERY 6 HOURS
Refills: 0 | Status: DISCONTINUED | OUTPATIENT
Start: 2022-09-29 | End: 2022-10-02

## 2022-09-29 RX ORDER — ASPIRIN/CALCIUM CARB/MAGNESIUM 324 MG
1 TABLET ORAL
Qty: 0 | Refills: 0 | DISCHARGE

## 2022-09-29 RX ORDER — FAMOTIDINE 10 MG/ML
20 INJECTION INTRAVENOUS ONCE
Refills: 0 | Status: DISCONTINUED | OUTPATIENT
Start: 2022-09-29 | End: 2022-09-29

## 2022-09-29 RX ORDER — DIPHENHYDRAMINE HCL 50 MG
25 CAPSULE ORAL EVERY 6 HOURS
Refills: 0 | Status: DISCONTINUED | OUTPATIENT
Start: 2022-09-29 | End: 2022-10-02

## 2022-09-29 RX ORDER — SODIUM CHLORIDE 9 MG/ML
1000 INJECTION, SOLUTION INTRAVENOUS ONCE
Refills: 0 | Status: DISCONTINUED | OUTPATIENT
Start: 2022-09-29 | End: 2022-09-30

## 2022-09-29 RX ORDER — SODIUM CHLORIDE 9 MG/ML
1000 INJECTION, SOLUTION INTRAVENOUS
Refills: 0 | Status: DISCONTINUED | OUTPATIENT
Start: 2022-09-29 | End: 2022-09-30

## 2022-09-29 RX ORDER — SODIUM CHLORIDE 9 MG/ML
1000 INJECTION, SOLUTION INTRAVENOUS ONCE
Refills: 0 | Status: COMPLETED | OUTPATIENT
Start: 2022-09-29 | End: 2022-09-29

## 2022-09-29 RX ORDER — ONDANSETRON 8 MG/1
4 TABLET, FILM COATED ORAL EVERY 6 HOURS
Refills: 0 | Status: DISCONTINUED | OUTPATIENT
Start: 2022-09-29 | End: 2022-09-30

## 2022-09-29 RX ORDER — DEXAMETHASONE 0.5 MG/5ML
4 ELIXIR ORAL EVERY 6 HOURS
Refills: 0 | Status: DISCONTINUED | OUTPATIENT
Start: 2022-09-29 | End: 2022-09-30

## 2022-09-29 RX ORDER — NALBUPHINE HYDROCHLORIDE 10 MG/ML
2.5 INJECTION, SOLUTION INTRAMUSCULAR; INTRAVENOUS; SUBCUTANEOUS EVERY 6 HOURS
Refills: 0 | Status: DISCONTINUED | OUTPATIENT
Start: 2022-09-29 | End: 2022-09-30

## 2022-09-29 RX ORDER — HEPARIN SODIUM 5000 [USP'U]/ML
5000 INJECTION INTRAVENOUS; SUBCUTANEOUS EVERY 12 HOURS
Refills: 0 | Status: DISCONTINUED | OUTPATIENT
Start: 2022-09-29 | End: 2022-10-02

## 2022-09-29 RX ORDER — VALACYCLOVIR 500 MG/1
0 TABLET, FILM COATED ORAL
Qty: 0 | Refills: 0 | DISCHARGE

## 2022-09-29 RX ORDER — KETOROLAC TROMETHAMINE 30 MG/ML
30 SYRINGE (ML) INJECTION EVERY 6 HOURS
Refills: 0 | Status: DISCONTINUED | OUTPATIENT
Start: 2022-09-29 | End: 2022-09-30

## 2022-09-29 RX ORDER — SODIUM CHLORIDE 9 MG/ML
1000 INJECTION, SOLUTION INTRAVENOUS
Refills: 0 | Status: DISCONTINUED | OUTPATIENT
Start: 2022-09-29 | End: 2022-09-29

## 2022-09-29 RX ORDER — MAGNESIUM HYDROXIDE 400 MG/1
30 TABLET, CHEWABLE ORAL
Refills: 0 | Status: DISCONTINUED | OUTPATIENT
Start: 2022-09-29 | End: 2022-10-02

## 2022-09-29 RX ORDER — IBUPROFEN 200 MG
600 TABLET ORAL EVERY 6 HOURS
Refills: 0 | Status: COMPLETED | OUTPATIENT
Start: 2022-09-30 | End: 2023-08-29

## 2022-09-29 RX ORDER — INFLUENZA VIRUS VACCINE 15; 15; 15; 15 UG/.5ML; UG/.5ML; UG/.5ML; UG/.5ML
0.5 SUSPENSION INTRAMUSCULAR ONCE
Refills: 0 | Status: DISCONTINUED | OUTPATIENT
Start: 2022-09-29 | End: 2022-10-02

## 2022-09-29 RX ORDER — ACETAMINOPHEN 500 MG
975 TABLET ORAL
Refills: 0 | Status: DISCONTINUED | OUTPATIENT
Start: 2022-09-29 | End: 2022-10-02

## 2022-09-29 RX ORDER — FOLIC ACID 0.8 MG
1 TABLET ORAL
Qty: 0 | Refills: 0 | DISCHARGE

## 2022-09-29 RX ORDER — SIMETHICONE 80 MG/1
80 TABLET, CHEWABLE ORAL EVERY 4 HOURS
Refills: 0 | Status: DISCONTINUED | OUTPATIENT
Start: 2022-09-29 | End: 2022-10-02

## 2022-09-29 RX ORDER — SODIUM CHLORIDE 9 MG/ML
1000 INJECTION, SOLUTION INTRAVENOUS ONCE
Refills: 0 | Status: DISCONTINUED | OUTPATIENT
Start: 2022-09-29 | End: 2022-09-29

## 2022-09-29 RX ORDER — OXYCODONE HYDROCHLORIDE 5 MG/1
10 TABLET ORAL
Refills: 0 | Status: DISCONTINUED | OUTPATIENT
Start: 2022-09-29 | End: 2022-09-30

## 2022-09-29 RX ORDER — OXYTOCIN 10 UNIT/ML
333.33 VIAL (ML) INJECTION
Qty: 20 | Refills: 0 | Status: DISCONTINUED | OUTPATIENT
Start: 2022-09-29 | End: 2022-09-30

## 2022-09-29 RX ORDER — NALOXONE HYDROCHLORIDE 4 MG/.1ML
0.1 SPRAY NASAL
Refills: 0 | Status: DISCONTINUED | OUTPATIENT
Start: 2022-09-29 | End: 2022-09-30

## 2022-09-29 RX ORDER — ACETAMINOPHEN 500 MG
1000 TABLET ORAL ONCE
Refills: 0 | Status: COMPLETED | OUTPATIENT
Start: 2022-09-29 | End: 2022-09-29

## 2022-09-29 RX ORDER — CITRIC ACID/SODIUM CITRATE 300-500 MG
30 SOLUTION, ORAL ORAL ONCE
Refills: 0 | Status: DISCONTINUED | OUTPATIENT
Start: 2022-09-29 | End: 2022-09-29

## 2022-09-29 RX ORDER — CITRIC ACID/SODIUM CITRATE 300-500 MG
30 SOLUTION, ORAL ORAL ONCE
Refills: 0 | Status: DISCONTINUED | OUTPATIENT
Start: 2022-09-29 | End: 2022-09-30

## 2022-09-29 RX ADMIN — SODIUM CHLORIDE 1000 MILLILITER(S): 9 INJECTION, SOLUTION INTRAVENOUS at 09:15

## 2022-09-29 RX ADMIN — SODIUM CHLORIDE 125 MILLILITER(S): 9 INJECTION, SOLUTION INTRAVENOUS at 06:59

## 2022-09-29 RX ADMIN — SIMETHICONE 80 MILLIGRAM(S): 80 TABLET, CHEWABLE ORAL at 21:20

## 2022-09-29 RX ADMIN — SODIUM CHLORIDE 2000 MILLILITER(S): 9 INJECTION, SOLUTION INTRAVENOUS at 06:00

## 2022-09-29 RX ADMIN — HEPARIN SODIUM 5000 UNIT(S): 5000 INJECTION INTRAVENOUS; SUBCUTANEOUS at 17:31

## 2022-09-29 RX ADMIN — Medication 30 MILLIGRAM(S): at 16:16

## 2022-09-29 RX ADMIN — Medication 1000 MILLIUNIT(S)/MIN: at 10:56

## 2022-09-29 RX ADMIN — Medication 30 MILLIGRAM(S): at 17:00

## 2022-09-29 RX ADMIN — Medication 30 MILLILITER(S): at 07:11

## 2022-09-29 RX ADMIN — Medication 30 MILLIGRAM(S): at 22:00

## 2022-09-29 RX ADMIN — Medication 1000 MILLIGRAM(S): at 11:26

## 2022-09-29 RX ADMIN — Medication 30 MILLIGRAM(S): at 21:20

## 2022-09-29 RX ADMIN — Medication 400 MILLIGRAM(S): at 10:56

## 2022-09-29 RX ADMIN — Medication 975 MILLIGRAM(S): at 18:30

## 2022-09-29 RX ADMIN — Medication 975 MILLIGRAM(S): at 17:32

## 2022-09-29 RX ADMIN — MAGNESIUM HYDROXIDE 30 MILLILITER(S): 400 TABLET, CHEWABLE ORAL at 21:20

## 2022-09-29 RX ADMIN — SODIUM CHLORIDE 75 MILLILITER(S): 9 INJECTION, SOLUTION INTRAVENOUS at 10:55

## 2022-09-29 RX ADMIN — FAMOTIDINE 20 MILLIGRAM(S): 10 INJECTION INTRAVENOUS at 07:11

## 2022-09-29 NOTE — OB NEONATOLOGY/PEDIATRICIAN DELIVERY SUMMARY - NSPEDSNEONOTESB_OBGYN_ALL_OB_FT
34.5 wk male Twin B born via primary CS to a 27 y/o  blood type B+ mother. Maternal history of HSV on valtrex, last outbreak 2022., anemia on iron transfusions, last 2022, COVID positive 2022. PNL -/-/NR/I, GBS - on . AROM at delivery with clear fluids. Baby emerged vigorous, crying, was w/d/s/s with APGARS of 8/9. Mom plans to initiate breastfeeding/formula feed, consents Hep B vaccine and declines circ.  Temp in OR was 36.5. 34.5 wk mono-di Twin B male born via primary CS to a 29 y/o  blood type B+ mother. Maternal history of HSV on valtrex, last outbreak 2022., anemia on iron transfusions, last 2022, COVID positive 2022. Prenatal hx significant for IUGR. PNL -/-/NR/I, GBS - on . AROM at delivery with clear fluids. Baby emerged vigorous, crying, was w/d/s/s with APGARS of 8/9. Mom plans to initiate breastfeeding/formula feed, consents Hep B vaccine and declines circ.  Temp in OR was 36.5. 34.5 wk mono-di Twin B male born via primary CS to a 27 y/o  blood type B+ mother. Maternal history of HSV on valtrex, last outbreak 2022., anemia on iron transfusions, last 2022, COVID positive 2022. Prenatal hx significant for IUGR. PNL -/-/NR/I, GBS - on . AROM at delivery with clear fluids. Baby emerged vigorous, crying, was w/d/s/s with APGARS of 8/9. Mom plans to initiate breastfeeding/formula feed, consents Hep B vaccine and declines circ.  Temp prior to leaving OR was 36.5C. 34.5 wk mono-di Twin B male born via primary CS to a 27 y/o  blood type B+ mother. Maternal history of HSV on valtrex, last outbreak 2022., anemia on iron transfusions, last 2022, COVID positive 2022. Prenatal hx significant for IUGR in Twin B, prenatal echo cannot rule out small VSD. PNL -/-/NR/I, GBS - on . AROM at delivery with clear fluids. Baby emerged vigorous, crying, was w/d/s/s with APGARS of 8/9. Mom plans to initiate breastfeeding/formula feed, consents Hep B vaccine and declines circ.  Temp prior to leaving OR was 36.5C.

## 2022-09-29 NOTE — OB RN INTRAOPERATIVE NOTE - NS_DRESSINGLOCATION_OBGYN_ALL_OB_FT
incisional
Airway patent, Nasal mucosa clear. Mouth with normal mucosa. Throat has no vesicles, no oropharyngeal exudates and uvula is midline.

## 2022-09-29 NOTE — DISCHARGE NOTE OB - PATIENT PORTAL LINK FT
You can access the FollowMyHealth Patient Portal offered by [facility] by registering at the following website: [email or web address]. By joining [employer]’s FollowMyHealth portal, you will also be able to view your health information using other applications (apps) compatible with our system.

## 2022-09-29 NOTE — PROVIDER CONTACT NOTE (OTHER) - BACKGROUND
Pt is a  s/p c/s for mono-di twins. QBL was 273mL. Fluids In was 2300 and urine output was 273 in the OR.

## 2022-09-29 NOTE — DISCHARGE NOTE OB - CARE PLAN
Principal Discharge DX:	Delivery by elective  section  Assessment and plan of treatment:	REGULAR DIET  AMBULATION ENCOURAGED   1

## 2022-09-29 NOTE — DISCHARGE NOTE OB - MEDICATION SUMMARY - MEDICATIONS TO TAKE
I will START or STAY ON the medications listed below when I get home from the hospital:    PNV Prenatal oral tablet  -- 1 tab(s) by mouth once a day  -- Indication: For Monochorionic diamniotic twin gestation in third trimester

## 2022-09-29 NOTE — DISCHARGE NOTE OB - CARE PROVIDER_API CALL
Rayo Candelario)  Obstetrics and Gynecology  [address] [place] [address]  Phone: [phone]  Fax: [phone]  Scheduled Appointment: 10/03/2022

## 2022-09-29 NOTE — OB NEONATOLOGY/PEDIATRICIAN DELIVERY SUMMARY - NSPEDSNEONOTESA_OBGYN_ALL_OB_FT
34.5 wk male Twin born via primary CS to a 27 y/o  blood type B+ mother. Maternal history of HSV on valtrex, last outbreak 2022., anemia on iron transfusions, last 2022, COVID positive 2022. PNL -/-/NR/I, GBS - on . AROM at delivery with clear fluids. Baby emerged vigorous, crying, was w/d/s/s with APGARS of 8/9. Mom plans to initiate breastfeeding/formula feed, consents Hep B vaccine and declines circ.  Temp in OR was 36.7. 34.5 wk mono-di Twin A male born via primary CS to a 27 y/o  blood type B+ mother. Maternal history of HSV on valtrex, last outbreak 2022., anemia on iron transfusions, last 2022, COVID positive 2022. PNL -/-/NR/I, GBS - on . AROM at delivery with clear fluids. Baby emerged vigorous, crying, was w/d/s/s with APGARS of 8/9. Mom plans to initiate breastfeeding/formula feed, consents Hep B vaccine and declines circ.  Temp in OR was 36.7. 34.5 wk mono-di Twin A male born via primary CS to a 27 y/o  blood type B+ mother. Maternal history of HSV on valtrex, last outbreak 2022., anemia on iron transfusions, last 2022, COVID positive 2022. PNL -/-/NR/I, GBS - on . AROM at delivery with clear fluids. Baby emerged vigorous, crying, was w/d/s/s with APGARS of 8/9. Mom plans to initiate breastfeeding/formula feed, consents Hep B vaccine and declines circ.  Temp prior to leaving OR was 36.7C. 34.5 wk mono-di Twin A male born via primary CS to a 29 y/o  blood type B+ mother. Maternal history of HSV on valtrex, last outbreak 2022., anemia on iron transfusions, last 2022, COVID positive 2022. Twin B with IUGR. PNL -/-/NR/I, GBS - on . AROM at delivery with clear fluids. Baby emerged vigorous, crying, was w/d/s/s with APGARS of 8/9. Mom plans to initiate breastfeeding/formula feed, consents Hep B vaccine and declines circ.  Temp prior to leaving OR was 36.7C.

## 2022-09-29 NOTE — OB PROVIDER DELIVERY SUMMARY - NSPROVIDERDELIVERYNOTE_OBGYN_ALL_OB_FT
scheduled pLTCS for multiple gestation   A. Viable male infant. APGARS 8/9. 2220g   B.  Viable male infant. APGARS 8/9.   Hysterotomy closed in single layer using caprosyn  Grossly normal uterus, tubes, and ovaries  Abdomen closed in standard fashion  Pt and infant to recovery in stable condition  Placenta to pathology. Infants to NICU.   EBL: 520  IVF: 2300  UOP: 100    Dictation: [id number]  Lluvia Correa, PGY-2   Lluvia Correa, PGY-2

## 2022-09-29 NOTE — OB RN PATIENT PROFILE - AGENT'S NAME
no loss of consciousness, no gait abnormality, no headache, no sensory deficits, and no weakness.
Frankie Mann

## 2022-09-29 NOTE — DISCHARGE NOTE OB - MATERIALS PROVIDED
[facility] [place] Screening Program/Breastfeeding Log/Guide to Postpartum Care/Back To Sleep Handout/Shaken Baby Prevention Handout/Breastfeeding Guide and Packet/Birth Certificate Instructions/Discharge Medication Information for Patients and Families Pocket Guide

## 2022-09-29 NOTE — OB RN TRIAGE NOTE - NSICDXPASTMEDICALHX_GEN_ALL_CORE_FT
PAST MEDICAL HISTORY:  2019 novel coronavirus disease (COVID-19) 3/2020 & 12/2021    Adenomyosis     Anemia s/p iron infusion x 8 on 08/2022    HSV-2 infection      PAST MEDICAL HISTORY:  2019 novel coronavirus disease (COVID-19) 3/2020 & 12/2021    Adenomyosis     Anemia s/p iron infusion x 8 on 08/2022    HSV-2 infection last outbreak August 2022

## 2022-09-29 NOTE — OB RN INTRAOPERATIVE NOTE - NSSELHIDDEN_OBGYN_ALL_OB_FT
[NS_DeliveryAttending1_OBGYN_ALL_OB_FT:[id number]],[NS_DeliveryAssist2_OBGYN_ALL_OB_FT:[id number]],[NS_DeliveryRN_OBGYN_ALL_OB_FT:[id number]]

## 2022-09-29 NOTE — OB PROVIDER H&P - HISTORY OF PRESENT ILLNESS
27 year old pregnant female  Mono-DI Twins, @ 34 5/7 weeks, presents to D&T with complaints of contractions that started since  and had gotten worse this AM around 1 AM, scheduled for primary   section 22 @ 0730. Patient denies vaginal  bleeding, spotting or leakage of amniotic fluid. Patient reports positive fetal movement. Denies fever, chills, headaches, changes in vision, chest pain, palpitations, shortness of breath, cough, nausea, vomiting, diarrhea, constipation, urinary symptoms, edema.    Prenatal care with Dr. Candelario  Prenatal course is complicated twin B IUGR delivery planning 32-34 weeks   GBS status is negative.  Patient denies signs and symptoms of COVID 19; denies symptomatic illness; fully vaccinated.    No adverse reactions to anesthesia, no objections to blood transfusions if clinically indicated.  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   2021 FT 5 lbs 9 oz  SAB x 1  anemia S/P Ion infusion x 8 round last dose on 09/15 Pt seen by hematology dr. Margie Kong   Med hx:  obesity  Surg hx:  Lap band 2018. Gastric sleeve 2014   breast lift and reduction 2019 D&C for uterine fibroid  GYN hx: denies hx of abnormal papsmear/cysts/STDs  Meds:  PNV, S/P Ion transfusion  Allergies: doxycycline Tetracycline (Hives)    Social hx: Denies alcohol, tobacco, drug use  Psych hx: denies hx of anxiety/depression; lives with spouse

## 2022-09-29 NOTE — OB PROVIDER H&P - ASSESSMENT
27 year old pregnant female  Mono-DI Twins, @ 34 5/7 weeks,   0530 discuss with Dr. Maddox  pt to be admitted to PACU for C/Section as Schedule.   ORders on Hold.       NAEEM rivas NP

## 2022-09-29 NOTE — OB PROVIDER H&P - NSCHILDCOND1_OBGYN_ALL_OB
Living 5-Fu Pregnancy And Lactation Text: This medication is Pregnancy Category X and contraindicated in pregnancy and in women who may become pregnant. It is unknown if this medication is excreted in breast milk.

## 2022-09-29 NOTE — OB PROVIDER H&P - ALERT: PERTINENT HISTORY
Fetal Sonogram/1st Trimester Sonogram/20 Week Level II Sonogram/BioPhysical Profile(s)/Fetal Non-Stress Test (NST)

## 2022-09-29 NOTE — OB PROVIDER DELIVERY SUMMARY - NSSELHIDDEN_OBGYN_ALL_OB_FT
[NS_DeliveryAttending1_OBGYN_ALL_OB_FT:[id number]],[NS_DeliveryAssist1_OBGYN_ALL_OB_FT:[id number]]

## 2022-09-29 NOTE — OB RN DELIVERY SUMMARY - NSSELHIDDEN_OBGYN_ALL_OB_FT
[NS_DeliveryAttending1_OBGYN_ALL_OB_FT:[id number]],[NS_DeliveryAssist2_OBGYN_ALL_OB_FT:[id number]],[NS_DeliveryRN_OBGYN_ALL_OB_FT:[id number]]

## 2022-09-29 NOTE — OB RN TRIAGE NOTE - NS_OBGYNHISTORY_OBGYN_ALL_OB_FT
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   2021 FT 5 lbs 9 oz  SAB x 1

## 2022-09-29 NOTE — OB RN DELIVERY SUMMARY - NS_REASONNOSKINB_OBGYN_ALL_OB
[place]'s Clinical Indication Maternal Clinical Indication/[place]'s Clinical Indication

## 2022-09-29 NOTE — OB RN PATIENT PROFILE - FALL HARM RISK - FACTORS NURSING JUDGEMENT
WOCN Note:  
 
New consult placed by RN for feet and toes. Chart shows: 
Admitted for fall at home She reports history of eczema. Assessment:  
Patient is communicative, continent and mobile - turned independently for assessment. Bed: total care SPORT - discussed with Riley Barroso, who will deliver a [facility] bed so the SPORT can be returned to ICU. Patient reports tenderness with cleaning of toes. Bilateral heel, buttocks, and sacral skin intact and without erythema. 1. POA, right second toe where great toenail has grown into skin = 1 x 0.3 x 0.1 cm  100% moist pink. no exudate or erythema. Significant amount of soft accumulated dead skin between all of toes cleaned away with soap. Aquacel Ag woven between all toes since many nails are overgrown. Dry desquamation to dorsum of hands, around trunk, on buttocks and on forehead. She is scratching. Aloe Vesta applied. Recommendations:   
Right 2nd toe: please clean well between toes and weave Aquacel Ag between toes for cushion and protection from overgrown nails Minimize layers of linen/pads under patient to optimize support surface. Turn/reposition approximately every 2 hours and offload heels. Manage incontinence / promote continence; Aloe Vesta cream to buttocks and sacrum daily and as needed Discussed above plan with Dr. Lynsey Fernandez 
 
Transition of Care: Plan to follow weekly and as needed while admitted to hospital.  
 
Elia Case, CHARUN, RN, [employer] Certified Wound, Ostomy, Continence Nurse 
office [phone] 
pager [phone] or call  to page
No

## 2022-09-29 NOTE — OB RN PATIENT PROFILE - FALL HARM RISK - UNIVERSAL INTERVENTIONS
Bed in lowest position, wheels locked, appropriate side rails in place/Call bell, personal items and telephone in reach/Instruct patient to call for assistance before getting out of bed or chair/Non-slip footwear when patient is out of bed/[place] to call system/Physically safe environment - no spills, clutter or unnecessary equipment/Purposeful Proactive Rounding/Room/bathroom lighting operational, light cord in reach

## 2022-09-29 NOTE — DISCHARGE NOTE OB - MEDICATION SUMMARY - MEDICATIONS TO STOP TAKING
I will STOP taking the medications listed below when I get home from the hospital:    Aspirin Low Dose 81 mg oral tablet, chewable  -- 1 tab(s) by mouth once a day    folic acid 0.4 mg oral tablet  -- 1 tab(s) by mouth once a day

## 2022-09-29 NOTE — OB RN TRIAGE NOTE - FALL HARM RISK - UNIVERSAL INTERVENTIONS
Bed in lowest position, wheels locked, appropriate side rails in place/Call bell, personal items and telephone in reach/Instruct patient to call for assistance before getting out of bed or chair/Non-slip footwear when patient is out of bed/[place] to call system/Physically safe environment - no spills, clutter or unnecessary equipment/Purposeful Proactive Rounding/Room/bathroom lighting operational, light cord in reach

## 2022-09-29 NOTE — OB PROVIDER H&P - NSHPPHYSICALEXAM_GEN_ALL_CORE
Vital Signs Last 24 Hrs  T(C): 36.8 (29 Sep 2022 04:29), Max: 36.8 (29 Sep 2022 04:22)  T(F): 98.2 (29 Sep 2022 04:29), Max: 98.24 (29 Sep 2022 04:22)  HR: 55 (29 Sep 2022 04:29) (55 - 55)  BP: 107/59 (29 Sep 2022 04:29) (107/59 - 107/59)  RR: 17 (29 Sep 2022 04:29) (17 - 17)      Parameters below as of 29 Sep 2022 04:29  Patient On (Oxygen Delivery Method): room air        Gen: NAD  Head: NC/AT  Cardio: S1S2+, RRR  Resp: CTABL, no wheezing  Abdomen: Soft, NT/ND, BS+  Extremities: No LE edema bilaterally    NST and BPP done to assess fetal surveillance and results as follows:  Twin A FHR: 130 HR baseline, moderate variability, accelerations present, no decelerations, Category 1.  Twin B FHR: 135 HR baseline, moderate variability, accelerations present, no decelerations, Category 1.  [place]: Contractions present, irregular,  BPP:  Twin A Vertex presentation , anterior placenta, MPV 3.88 BPP 8/8  Twin B Vertex mid maternal right anterior placenta BPP 8/8  SVE: 1/50/-3

## 2022-09-30 LAB
BASOPHILS # BLD AUTO: 0.02 K/UL — SIGNIFICANT CHANGE UP (ref 0–0.2)
BASOPHILS NFR BLD AUTO: 0.2 % — SIGNIFICANT CHANGE UP (ref 0–2)
EOSINOPHIL # BLD AUTO: 0.05 K/UL — SIGNIFICANT CHANGE UP (ref 0–0.5)
EOSINOPHIL NFR BLD AUTO: 0.5 % — SIGNIFICANT CHANGE UP (ref 0–6)
HCT VFR BLD CALC: 30.5 % — LOW (ref 34.5–45)
HGB BLD-MCNC: 9.6 G/DL — LOW (ref 11.5–15.5)
IANC: 7.37 K/UL — SIGNIFICANT CHANGE UP (ref 1.8–7.4)
IMM GRANULOCYTES NFR BLD AUTO: 0.5 % — SIGNIFICANT CHANGE UP (ref 0–0.9)
LYMPHOCYTES # BLD AUTO: 1.94 K/UL — SIGNIFICANT CHANGE UP (ref 1–3.3)
LYMPHOCYTES # BLD AUTO: 19.5 % — SIGNIFICANT CHANGE UP (ref 13–44)
MCHC RBC-ENTMCNC: 28 PG — SIGNIFICANT CHANGE UP (ref 27–34)
MCHC RBC-ENTMCNC: 31.5 GM/DL — LOW (ref 32–36)
MCV RBC AUTO: 88.9 FL — SIGNIFICANT CHANGE UP (ref 80–100)
MONOCYTES # BLD AUTO: 0.53 K/UL — SIGNIFICANT CHANGE UP (ref 0–0.9)
MONOCYTES NFR BLD AUTO: 5.3 % — SIGNIFICANT CHANGE UP (ref 2–14)
NEUTROPHILS # BLD AUTO: 7.37 K/UL — SIGNIFICANT CHANGE UP (ref 1.8–7.4)
NEUTROPHILS NFR BLD AUTO: 74 % — SIGNIFICANT CHANGE UP (ref 43–77)
NRBC # BLD: 0 /100 WBCS — SIGNIFICANT CHANGE UP (ref 0–0)
NRBC # FLD: 0 K/UL — SIGNIFICANT CHANGE UP (ref 0–0)
PLATELET # BLD AUTO: 180 K/UL — SIGNIFICANT CHANGE UP (ref 150–400)
RBC # BLD: 3.43 M/UL — LOW (ref 3.8–5.2)
RBC # FLD: SIGNIFICANT CHANGE UP (ref 10.3–14.5)
WBC # BLD: 9.96 K/UL — SIGNIFICANT CHANGE UP (ref 3.8–10.5)
WBC # FLD AUTO: 9.96 K/UL — SIGNIFICANT CHANGE UP (ref 3.8–10.5)

## 2022-09-30 RX ORDER — IBUPROFEN 200 MG
600 TABLET ORAL EVERY 6 HOURS
Refills: 0 | Status: DISCONTINUED | OUTPATIENT
Start: 2022-09-30 | End: 2022-10-02

## 2022-09-30 RX ADMIN — Medication 30 MILLIGRAM(S): at 02:57

## 2022-09-30 RX ADMIN — Medication 600 MILLIGRAM(S): at 22:20

## 2022-09-30 RX ADMIN — Medication 600 MILLIGRAM(S): at 16:15

## 2022-09-30 RX ADMIN — Medication 600 MILLIGRAM(S): at 21:21

## 2022-09-30 RX ADMIN — Medication 975 MILLIGRAM(S): at 13:50

## 2022-09-30 RX ADMIN — HEPARIN SODIUM 5000 UNIT(S): 5000 INJECTION INTRAVENOUS; SUBCUTANEOUS at 18:02

## 2022-09-30 RX ADMIN — Medication 30 MILLIGRAM(S): at 03:30

## 2022-09-30 RX ADMIN — Medication 975 MILLIGRAM(S): at 05:52

## 2022-09-30 RX ADMIN — SIMETHICONE 80 MILLIGRAM(S): 80 TABLET, CHEWABLE ORAL at 04:19

## 2022-09-30 RX ADMIN — HEPARIN SODIUM 5000 UNIT(S): 5000 INJECTION INTRAVENOUS; SUBCUTANEOUS at 05:52

## 2022-09-30 RX ADMIN — Medication 600 MILLIGRAM(S): at 08:56

## 2022-09-30 RX ADMIN — Medication 975 MILLIGRAM(S): at 13:20

## 2022-09-30 RX ADMIN — SIMETHICONE 80 MILLIGRAM(S): 80 TABLET, CHEWABLE ORAL at 21:21

## 2022-09-30 RX ADMIN — Medication 600 MILLIGRAM(S): at 16:45

## 2022-09-30 RX ADMIN — Medication 600 MILLIGRAM(S): at 09:06

## 2022-09-30 RX ADMIN — Medication 975 MILLIGRAM(S): at 01:00

## 2022-09-30 RX ADMIN — Medication 975 MILLIGRAM(S): at 00:19

## 2022-09-30 NOTE — PROGRESS NOTE ADULT - SUBJECTIVE AND OBJECTIVE BOX
Postpartum Note,  Section  She is a  27y woman who is now post-operative day:     Subjective:  The patient feels well.  She is ambulating.   She is tolerating regular diet.  She denies nausea and vomiting.  She is voiding.  Her pain is controlled.  She reports normal postpartum bleeding    Physical exam:    Vital Signs Last 24 Hrs  T(C): 36.7 (30 Sep 2022 01:12), Max: 36.8 (29 Sep 2022 06:35)  T(F): 98.1 (30 Sep 2022 01:12), Max: 98.3 (29 Sep 2022 21:52)  HR: 84 (30 Sep 2022 01:12) (45 - 84)  BP: 113/74 (30 Sep 2022 01:12) (86/48 - 120/89)  BP(mean): 75 (29 Sep 2022 11:30) (57 - 96)  RR: 18 (30 Sep 2022 01:12) (7 - 24)  SpO2: 100% (30 Sep 2022 01:12) (96% - 100%)    Parameters below as of 30 Sep 2022 01:12  Patient On (Oxygen Delivery Method): room air        Gen: NAD  Passed flatus  Breast: Soft, nontender, not engorged.  Abdomen: Soft, nontender, no distension , firm uterine fundus at umbilicus.  Incision: Clean, dry, and intact with steri strips  Pelvic: Normal lochia noted  Ext: No calf tenderness    LABS:                        10.8   8.11  )-----------( 167      ( 29 Sep 2022 05:45 )             33.3             Allergies    doxycycline (Hives)  tetracycline (Hives)    Intolerances      MEDICATIONS  (STANDING):  acetaminophen     Tablet .. 975 milliGRAM(s) Oral <User Schedule>  heparin   Injectable 5000 Unit(s) SubCutaneous every 12 hours  ibuprofen  Tablet. 600 milliGRAM(s) Oral every 6 hours  influenza   Vaccine 0.5 milliLiter(s) IntraMuscular once  ketorolac   Injectable 30 milliGRAM(s) IV Push every 6 hours  lactated ringers Bolus 1000 milliLiter(s) IV Bolus once    MEDICATIONS  (PRN):  dexAMETHasone  Injectable 4 milliGRAM(s) IV Push every 6 hours PRN Nausea  diphenhydrAMINE 25 milliGRAM(s) Oral every 6 hours PRN Pruritus  lanolin Ointment 1 Application(s) Topical every 6 hours PRN Sore Nipples  magnesium hydroxide Suspension 30 milliLiter(s) Oral two times a day PRN Constipation  nalbuphine Injectable 2.5 milliGRAM(s) IV Push every 6 hours PRN Pruritus  naloxone Injectable 0.1 milliGRAM(s) IV Push every 3 minutes PRN For ANY of the following changes in patient status:  A. Breaths Per Minute LESS THAN 10, B. Oxygen saturation LESS THAN 90%, C. Sedation score of 6 for Stop After: 4 Times  ondansetron Injectable 4 milliGRAM(s) IV Push every 6 hours PRN Nausea  oxyCODONE    IR 5 milliGRAM(s) Oral every 3 hours PRN Mild Pain (1 - 3)  oxyCODONE    IR 10 milliGRAM(s) Oral every 3 hours PRN Severe Pain (7 - 10)  simethicone 80 milliGRAM(s) Chew every 4 hours PRN Gas        Assessment and Plan  POD #1  s/p  section  Doing well.  Encourage ambulation.

## 2022-09-30 NOTE — LACTATION INITIAL EVALUATION - NS LACT CON REASON FOR REQ
34.5 weeks twins in nicu.  reviewed  with mother  frequency of pumping and cleaning of pump parts./early term/late  infant/staff request/patient request

## 2022-09-30 NOTE — PROGRESS NOTE ADULT - SUBJECTIVE AND OBJECTIVE BOX
OB Progress Note:  Delivery, POD#1    S: 28yo POD#1 s/p LTCS . Her pain is well controlled. She is tolerating a regular diet and passing flatus. Denies N/V. Denies CP/SOB/lightheadedness/dizziness. Patient reports mild widespread itching that has improved since yesterday.   She is ambulating without difficulty.   Voiding spontaneously.     O:   Vital Signs Last 24 Hrs  T(C): 36.4 (30 Sep 2022 06:22), Max: 36.8 (29 Sep 2022 21:52)  T(F): 97.5 (30 Sep 2022 06:22), Max: 98.3 (29 Sep 2022 21:52)  HR: 63 (30 Sep 2022 06:22) (45 - 84)  BP: 98/61 (30 Sep 2022 06:22) (86/48 - 120/89)  BP(mean): 75 (29 Sep 2022 11:30) (57 - 96)  RR: 19 (30 Sep 2022 06:22) (7 - 24)  SpO2: 99% (30 Sep 2022 06:22) (96% - 100%)    Parameters below as of 30 Sep 2022 06:22  Patient On (Oxygen Delivery Method): room air        Labs:  Blood type: B Positive  Rubella IgG: RPR: Negative                          10.8<L>   8.11 >-----------< 167    (  @ 05:45 )             33.3<L>                  PE:  General: NAD  Abdomen: Mildly distended, appropriately tender, incision c/d/i. Bandages removed; incision remains closed with dermabond   Extremities: No erythema, no pitting edema     OB Progress Note:  Delivery, POD#1    S: 28yo POD#1 s/p scheduled pLTCS for twin gestation, . Her pain is well controlled. She is tolerating a regular diet and passing flatus. Denies N/V. Denies CP/SOB/lightheadedness/dizziness. Patient reports mild widespread itching that has improved since yesterday.   She is ambulating without difficulty.   Voiding spontaneously.     O:   Vital Signs Last 24 Hrs  T(C): 36.4 (30 Sep 2022 06:22), Max: 36.8 (29 Sep 2022 21:52)  T(F): 97.5 (30 Sep 2022 06:22), Max: 98.3 (29 Sep 2022 21:52)  HR: 63 (30 Sep 2022 06:22) (45 - 84)  BP: 98/61 (30 Sep 2022 06:22) (86/48 - 120/89)  BP(mean): 75 (29 Sep 2022 11:30) (57 - 96)  RR: 19 (30 Sep 2022 06:22) (7 - 24)  SpO2: 99% (30 Sep 2022 06:22) (96% - 100%)    Parameters below as of 30 Sep 2022 06:22  Patient On (Oxygen Delivery Method): room air    Labs:  Blood type: B Positive  Rubella IgG: RPR: Negative                          10.8<L>   8.11 >-----------< 167    (  @ 05:45 )             33.3<L>      PE:  General: NAD  Abdomen: Mildly distended, appropriately tender, incision c/d/i with dermabond   Extremities: No erythema, no pitting edema

## 2022-09-30 NOTE — PROGRESS NOTE ADULT - ASSESSMENT
A/P: 28yo POD#1 s/p LTCS.  Patient is stable and doing well post-operatively.    - Continue regular diet.  - Increase ambulation.  - Continue motrin, tylenol, oxycodone PRN for pain control.     -f/u AM CBC    Paloma Yuan MD PGY1 A/P: 26yo POD#1 s/p scheduled pLTCS for twin gestation, .  Patient is stable and doing well post-operatively.    - Continue regular diet.  - Increase ambulation.  - Continue motrin, tylenol, oxycodone PRN for pain control.     - f/u AM CBC 9/30    Paloma Yuan MD PGY1

## 2022-10-01 RX ADMIN — Medication 975 MILLIGRAM(S): at 14:28

## 2022-10-01 RX ADMIN — Medication 975 MILLIGRAM(S): at 05:24

## 2022-10-01 RX ADMIN — Medication 975 MILLIGRAM(S): at 00:25

## 2022-10-01 RX ADMIN — HEPARIN SODIUM 5000 UNIT(S): 5000 INJECTION INTRAVENOUS; SUBCUTANEOUS at 05:24

## 2022-10-01 RX ADMIN — Medication 600 MILLIGRAM(S): at 02:14

## 2022-10-01 RX ADMIN — HEPARIN SODIUM 5000 UNIT(S): 5000 INJECTION INTRAVENOUS; SUBCUTANEOUS at 17:27

## 2022-10-01 RX ADMIN — Medication 600 MILLIGRAM(S): at 16:22

## 2022-10-01 RX ADMIN — Medication 975 MILLIGRAM(S): at 06:20

## 2022-10-01 RX ADMIN — Medication 600 MILLIGRAM(S): at 23:10

## 2022-10-01 RX ADMIN — Medication 975 MILLIGRAM(S): at 01:22

## 2022-10-01 RX ADMIN — Medication 600 MILLIGRAM(S): at 09:25

## 2022-10-01 RX ADMIN — SIMETHICONE 80 MILLIGRAM(S): 80 TABLET, CHEWABLE ORAL at 22:26

## 2022-10-01 RX ADMIN — Medication 600 MILLIGRAM(S): at 15:35

## 2022-10-01 RX ADMIN — Medication 600 MILLIGRAM(S): at 10:00

## 2022-10-01 RX ADMIN — Medication 975 MILLIGRAM(S): at 13:48

## 2022-10-01 RX ADMIN — Medication 600 MILLIGRAM(S): at 03:10

## 2022-10-01 RX ADMIN — Medication 600 MILLIGRAM(S): at 22:20

## 2022-10-02 VITALS
RESPIRATION RATE: 18 BRPM | SYSTOLIC BLOOD PRESSURE: 116 MMHG | OXYGEN SATURATION: 100 % | DIASTOLIC BLOOD PRESSURE: 75 MMHG | TEMPERATURE: 98 F

## 2022-10-02 RX ORDER — IBUPROFEN 200 MG
1 TABLET ORAL
Qty: 0 | Refills: 0 | DISCHARGE
Start: 2022-10-02

## 2022-10-02 RX ORDER — ACETAMINOPHEN 500 MG
3 TABLET ORAL
Qty: 0 | Refills: 0 | DISCHARGE
Start: 2022-10-02

## 2022-10-02 RX ADMIN — Medication 975 MILLIGRAM(S): at 00:38

## 2022-10-02 RX ADMIN — Medication 975 MILLIGRAM(S): at 13:04

## 2022-10-02 RX ADMIN — Medication 600 MILLIGRAM(S): at 10:47

## 2022-10-02 RX ADMIN — Medication 975 MILLIGRAM(S): at 14:00

## 2022-10-02 RX ADMIN — Medication 975 MILLIGRAM(S): at 05:43

## 2022-10-02 RX ADMIN — Medication 600 MILLIGRAM(S): at 11:30

## 2022-10-02 RX ADMIN — HEPARIN SODIUM 5000 UNIT(S): 5000 INJECTION INTRAVENOUS; SUBCUTANEOUS at 05:43

## 2022-10-02 NOTE — PROGRESS NOTE ADULT - SUBJECTIVE AND OBJECTIVE BOX
Postpartum Note,  Section  She is a  27y woman who is now post-operative day:     Subjective:  The patient feels well.  She is ambulating.   She is tolerating regular diet.  She denies nausea and vomiting.  She is voiding.  Her pain is controlled.  She reports normal postpartum bleeding.  She is breastfeeding.  She is formula feeding.    Physical exam:    Vital Signs Last 24 Hrs  T(C): 37.1 (02 Oct 2022 06:15), Max: 37.4 (01 Oct 2022 14:08)  T(F): 98.7 (02 Oct 2022 06:15), Max: 99.3 (01 Oct 2022 14:08)  HR: 56 (02 Oct 2022 06:15) (56 - 60)  BP: 103/71 (02 Oct 2022 06:15) (103/71 - 125/76)  BP(mean): --  RR: 18 (02 Oct 2022 06:15) (18 - 18)  SpO2: 100% (02 Oct 2022 06:15) (100% - 100%)    Parameters below as of 02 Oct 2022 06:15  Patient On (Oxygen Delivery Method): room air        Gen: NAD  Breast: Soft, nontender, not engorged.  Abdomen: Soft, nontender, no distension , firm uterine fundus at umbilicus.  Incision: Clean, dry, and intact with steri strips  Pelvic: Normal lochia noted  Ext: No calf tenderness    LABS:      Rubella status:     Allergies    doxycycline (Hives)  tetracycline (Hives)    Intolerances      MEDICATIONS  (STANDING):  acetaminophen     Tablet .. 975 milliGRAM(s) Oral <User Schedule>  heparin   Injectable 5000 Unit(s) SubCutaneous every 12 hours  ibuprofen  Tablet. 600 milliGRAM(s) Oral every 6 hours  influenza   Vaccine 0.5 milliLiter(s) IntraMuscular once    MEDICATIONS  (PRN):  diphenhydrAMINE 25 milliGRAM(s) Oral every 6 hours PRN Pruritus  lanolin Ointment 1 Application(s) Topical every 6 hours PRN Sore Nipples  magnesium hydroxide Suspension 30 milliLiter(s) Oral two times a day PRN Constipation  simethicone 80 milliGRAM(s) Chew every 4 hours PRN Gas        Assessment and Plan  POD #2   s/p  section  Doing well.  Encourage ambulation.  Continue routine post op care.  discharge today

## 2022-10-04 NOTE — OB PST NOTE - NS_PRENATALMEDS_OBGYN_A_OB
Liver biopsy and abscess drain placement complete. Pt tolerated well. VSS. No signs or symptoms of distress noted. Pt will be transferred to MPU bed escorted by this RN and report called to floor nurse and handoff to RN in MPU Recovery.  Specimens to lab and pathology.     
Patient received s/p liver biopsy and abscess drain placement in MPU bay 4. See VS flowsheet. Procedure site dressing is clean and dry, no evidence of bleeding or hematoma formation. Patient to recover for 1 hour and return to room [address]9095. Fall precautions reviewed. Bed in lowest, locked position. Call light within reach.    
Pt arrived to [address].  No signs of evident distress or complaint of pain.  Pt. Ambulating in room independently.  Non slip socks worn when OOB. Wife at bedside.  Sara CAMILO Notified of pt. Arrival.  Signed and held orders released.  Right FA 20g PIV placed.  Dressing CDI  NPO at midnight for ERCP tomorrow.  Pt. Aware.  Oriented to room, call light and plan of care.  Pt. Expressed understanding.    Bed in lowest locked position.  Call light within reach.  Instructed to call for assistance.  Pt. Expressed understanding.    
valtrex/Prenatal Vitamins/Aspirin/Other

## 2022-11-14 NOTE — OB PROVIDER H&P - NSOBVTERISKREFER_OBGYN_ALL_OB
Called shonda number on chart and left a voice message that there is no sooner appt available .     ----- Message from Roni Chakraborty MA sent at 11/11/2022  2:29 PM CST -----    ----- Message -----  From: Artie Wilkinson RN  Sent: 11/11/2022  11:31 AM CST  To: [facility] Staff    Good morning,     This pt and family are requesting a sooner appt with Dr. Peraza than Will.  Pt is discharging today.    Thank you,  Artie Wilkinson RN CM  Case Management  [phone]      
Refer to the Assessment tab to view/cancel completed assessment.

## 2022-12-02 LAB — SURGICAL PATHOLOGY STUDY: SIGNIFICANT CHANGE UP

## 2023-01-30 NOTE — OB PST NOTE - NS_FETALANOMAL_OBGYN_ALL_OB
Ebastine is a antihistamine like benadryl. Zovorix is acyclovir for the viral chicken pox infection. Clarify the last one or if there is another name for it.      Yes they are safe Unknown

## 2023-04-17 NOTE — OB PST NOTE - WEIGHT PRE-PREGNANCY IN KG
76 Azelaic Acid Counseling: Patient counseled that medicine may cause skin irritation and to avoid applying near the eyes.  In the event of skin irritation, the patient was advised to reduce the amount of the drug applied or use it less frequently.   The patient verbalized understanding of the proper use and possible adverse effects of azelaic acid.  All of the patient's questions and concerns were addressed.

## 2023-05-04 NOTE — ASU PATIENT PROFILE, ADULT - MEDICATION ADMINISTRATION INFO, PROFILE
no concerns Benzoyl Peroxide Pregnancy And Lactation Text: This medication is Pregnancy Category C. It is unknown if benzoyl peroxide is excreted in breast milk.

## 2023-05-19 NOTE — OB PROVIDER H&P - NS_CTXBYPALP_OBGYN_ALL_OB
[Time Spent: ___ minutes] : I have spent [unfilled] minutes of time on the encounter. None felt Localized Dermabrasion With Wire Brush Text: The patient was draped in routine manner.  Localized dermabrasion using 3 x 17 mm wire brush was performed in routine manner to papillary dermis. This spot dermabrasion is being performed to complete skin cancer reconstruction. It also will eliminate the other sun damaged precancerous cells that are known to be part of the regional effect of a lifetime's worth of sun exposure. This localized dermabrasion is therapeutic and should not be considered cosmetic in any regard. Localized Dermabrasion Text: The patient was draped in routine manner.  Localized dermabrasion using 3 x 17 mm wire brush was performed in routine manner to papillary dermis. This spot dermabrasion is being performed to complete skin cancer reconstruction. It also will eliminate the other sun damaged precancerous cells that are known to be part of the regional effect of a lifetime's worth of sun exposure. This localized dermabrasion is therapeutic and should not be considered cosmetic in any regard.

## 2023-05-26 NOTE — OB RN TRIAGE NOTE - INTERNATIONAL TRAVEL
See follow up visit from  5/23/2023   .  brDate / Results of last TB test  8/23/2023   -   Negative  brLabs and/or Additional orders: CBC &amp CMP due in November:CMP, CBC, CRP, ESR &amp IFX levels drawn today brInsurance authorization: [facility] for continuation of Remicade-05/23/23-05/23/24 (SP)brPharmacy:  Specialty  brRate of Infusion:  1 hour   
br   Infusion order placed on:  1/5/2023   
br 
No
